# Patient Record
Sex: MALE | Race: WHITE | Employment: OTHER | URBAN - METROPOLITAN AREA
[De-identification: names, ages, dates, MRNs, and addresses within clinical notes are randomized per-mention and may not be internally consistent; named-entity substitution may affect disease eponyms.]

---

## 2018-01-01 ENCOUNTER — APPOINTMENT (OUTPATIENT)
Dept: DIALYSIS | Age: 68
DRG: 871 | End: 2018-01-01
Payer: MEDICARE

## 2018-01-01 ENCOUNTER — HOSPITAL ENCOUNTER (EMERGENCY)
Age: 68
Discharge: ANOTHER HOSPITAL | End: 2018-03-17
Payer: MEDICARE

## 2018-01-01 ENCOUNTER — APPOINTMENT (OUTPATIENT)
Dept: GENERAL RADIOLOGY | Age: 68
DRG: 871 | End: 2018-01-01
Payer: MEDICARE

## 2018-01-01 ENCOUNTER — APPOINTMENT (OUTPATIENT)
Dept: GENERAL RADIOLOGY | Age: 68
End: 2018-01-01
Payer: MEDICARE

## 2018-01-01 ENCOUNTER — APPOINTMENT (OUTPATIENT)
Dept: CT IMAGING | Age: 68
DRG: 871 | End: 2018-01-01
Payer: MEDICARE

## 2018-01-01 ENCOUNTER — APPOINTMENT (OUTPATIENT)
Dept: MRI IMAGING | Age: 68
DRG: 871 | End: 2018-01-01
Payer: MEDICARE

## 2018-01-01 ENCOUNTER — TELEPHONE (OUTPATIENT)
Dept: INTERNAL MEDICINE | Age: 68
End: 2018-01-01

## 2018-01-01 ENCOUNTER — HOSPITAL ENCOUNTER (INPATIENT)
Age: 68
LOS: 5 days | DRG: 871 | End: 2018-03-22
Attending: EMERGENCY MEDICINE | Admitting: INTERNAL MEDICINE
Payer: MEDICARE

## 2018-01-01 VITALS
RESPIRATION RATE: 24 BRPM | TEMPERATURE: 101.9 F | WEIGHT: 254.19 LBS | HEIGHT: 71 IN | HEART RATE: 103 BPM | OXYGEN SATURATION: 97 % | DIASTOLIC BLOOD PRESSURE: 47 MMHG | BODY MASS INDEX: 35.59 KG/M2 | SYSTOLIC BLOOD PRESSURE: 77 MMHG

## 2018-01-01 VITALS
SYSTOLIC BLOOD PRESSURE: 102 MMHG | TEMPERATURE: 98.1 F | OXYGEN SATURATION: 97 % | WEIGHT: 242.51 LBS | RESPIRATION RATE: 24 BRPM | HEART RATE: 73 BPM | DIASTOLIC BLOOD PRESSURE: 41 MMHG

## 2018-01-01 DIAGNOSIS — I46.9 CARDIAC ARREST (HCC): Primary | ICD-10-CM

## 2018-01-01 DIAGNOSIS — A41.9 SEPSIS, DUE TO UNSPECIFIED ORGANISM: ICD-10-CM

## 2018-01-01 DIAGNOSIS — N39.0 URINARY TRACT INFECTION WITHOUT HEMATURIA, SITE UNSPECIFIED: ICD-10-CM

## 2018-01-01 LAB
-: ABNORMAL
ABSOLUTE EOS #: 0 K/UL (ref 0–0.4)
ABSOLUTE EOS #: 0.11 K/UL (ref 0–0.4)
ABSOLUTE EOS #: 0.19 K/UL (ref 0–0.4)
ABSOLUTE EOS #: 0.21 K/UL (ref 0–0.4)
ABSOLUTE IMMATURE GRANULOCYTE: 0 K/UL (ref 0–0.3)
ABSOLUTE IMMATURE GRANULOCYTE: 0 K/UL (ref 0–0.3)
ABSOLUTE IMMATURE GRANULOCYTE: 0.21 K/UL (ref 0–0.3)
ABSOLUTE IMMATURE GRANULOCYTE: ABNORMAL K/UL (ref 0–0.3)
ABSOLUTE LYMPH #: 0.56 K/UL (ref 1–4.8)
ABSOLUTE LYMPH #: 0.68 K/UL (ref 1–4.8)
ABSOLUTE LYMPH #: 0.77 K/UL (ref 1–4.8)
ABSOLUTE LYMPH #: 1.05 K/UL (ref 1–4.8)
ABSOLUTE MONO #: 0.34 K/UL (ref 0.1–0.8)
ABSOLUTE MONO #: 0.37 K/UL (ref 0.2–0.8)
ABSOLUTE MONO #: 1.88 K/UL (ref 0.1–0.8)
ABSOLUTE MONO #: 2.06 K/UL (ref 0.1–0.8)
ALBUMIN SERPL-MCNC: 3.2 G/DL (ref 3.5–5.2)
ALBUMIN SERPL-MCNC: 3.4 G/DL (ref 3.5–5.2)
ALBUMIN/GLOBULIN RATIO: 1.4 (ref 1–2.5)
ALBUMIN/GLOBULIN RATIO: ABNORMAL (ref 1–2.5)
ALLEN TEST: ABNORMAL
ALLEN TEST: POSITIVE
ALLEN TEST: POSITIVE
ALP BLD-CCNC: 96 U/L (ref 40–129)
ALP BLD-CCNC: 96 U/L (ref 40–129)
ALT SERPL-CCNC: 70 U/L (ref 5–41)
ALT SERPL-CCNC: 74 U/L (ref 5–41)
AMMONIA: 29 UMOL/L (ref 16–60)
AMORPHOUS: ABNORMAL
ANION GAP SERPL CALCULATED.3IONS-SCNC: 14 MMOL/L (ref 9–17)
ANION GAP SERPL CALCULATED.3IONS-SCNC: 16 MMOL/L (ref 9–17)
ANION GAP SERPL CALCULATED.3IONS-SCNC: 17 MMOL/L (ref 9–17)
ANION GAP SERPL CALCULATED.3IONS-SCNC: 17 MMOL/L (ref 9–17)
ANION GAP SERPL CALCULATED.3IONS-SCNC: 18 MMOL/L (ref 9–17)
ANION GAP SERPL CALCULATED.3IONS-SCNC: 19 MMOL/L (ref 9–17)
ANION GAP SERPL CALCULATED.3IONS-SCNC: 19 MMOL/L (ref 9–17)
ANION GAP: 8 MMOL/L (ref 7–16)
AST SERPL-CCNC: 66 U/L
AST SERPL-CCNC: 72 U/L
BACTERIA: ABNORMAL
BASOPHILS # BLD: 0 %
BASOPHILS # BLD: 0 % (ref 0–2)
BASOPHILS # BLD: 0 % (ref 0–2)
BASOPHILS # BLD: 1 % (ref 0–2)
BASOPHILS ABSOLUTE: 0 K/UL (ref 0–0.2)
BASOPHILS ABSOLUTE: 0.21 K/UL (ref 0–0.2)
BILIRUB SERPL-MCNC: 0.38 MG/DL (ref 0.3–1.2)
BILIRUB SERPL-MCNC: 0.42 MG/DL (ref 0.3–1.2)
BILIRUBIN DIRECT: 0.15 MG/DL
BILIRUBIN DIRECT: 0.18 MG/DL
BILIRUBIN URINE: ABNORMAL
BILIRUBIN, INDIRECT: 0.23 MG/DL (ref 0–1)
BILIRUBIN, INDIRECT: 0.24 MG/DL (ref 0–1)
BNP INTERPRETATION: ABNORMAL
BUN BLDV-MCNC: 22 MG/DL (ref 8–23)
BUN BLDV-MCNC: 26 MG/DL (ref 8–23)
BUN BLDV-MCNC: 35 MG/DL (ref 8–23)
BUN BLDV-MCNC: 37 MG/DL (ref 8–23)
BUN BLDV-MCNC: 40 MG/DL (ref 8–23)
BUN BLDV-MCNC: 41 MG/DL (ref 8–23)
BUN BLDV-MCNC: 43 MG/DL (ref 8–23)
BUN BLDV-MCNC: 44 MG/DL (ref 8–23)
BUN BLDV-MCNC: 47 MG/DL (ref 8–23)
BUN BLDV-MCNC: 48 MG/DL (ref 8–23)
BUN BLDV-MCNC: 50 MG/DL (ref 8–23)
BUN/CREAT BLD: 5 (ref 9–20)
BUN/CREAT BLD: ABNORMAL (ref 9–20)
CALCIUM IONIZED: 0.97 MMOL/L (ref 1.13–1.33)
CALCIUM IONIZED: 1.04 MMOL/L (ref 1.13–1.33)
CALCIUM IONIZED: 1.06 MMOL/L (ref 1.13–1.33)
CALCIUM IONIZED: 1.06 MMOL/L (ref 1.13–1.33)
CALCIUM IONIZED: 1.08 MMOL/L (ref 1.13–1.33)
CALCIUM IONIZED: 1.11 MMOL/L (ref 1.13–1.33)
CALCIUM IONIZED: 1.11 MMOL/L (ref 1.13–1.33)
CALCIUM IONIZED: 1.14 MMOL/L (ref 1.13–1.33)
CALCIUM IONIZED: 1.17 MMOL/L (ref 1.13–1.33)
CALCIUM IONIZED: 1.22 MMOL/L (ref 1.13–1.33)
CALCIUM SERPL-MCNC: 7.9 MG/DL (ref 8.6–10.4)
CALCIUM SERPL-MCNC: 8.2 MG/DL (ref 8.6–10.4)
CALCIUM SERPL-MCNC: 8.4 MG/DL (ref 8.6–10.4)
CALCIUM SERPL-MCNC: 8.6 MG/DL (ref 8.6–10.4)
CASTS UA: ABNORMAL /LPF (ref 0–2)
CHLORIDE BLD-SCNC: 89 MMOL/L (ref 98–107)
CHLORIDE BLD-SCNC: 90 MMOL/L (ref 98–107)
CHLORIDE BLD-SCNC: 91 MMOL/L (ref 98–107)
CHLORIDE BLD-SCNC: 92 MMOL/L (ref 98–107)
CHLORIDE BLD-SCNC: 93 MMOL/L (ref 98–107)
CHLORIDE BLD-SCNC: 94 MMOL/L (ref 98–107)
CHLORIDE BLD-SCNC: 94 MMOL/L (ref 98–107)
CHLORIDE BLD-SCNC: 96 MMOL/L (ref 98–107)
CHLORIDE BLD-SCNC: 96 MMOL/L (ref 98–107)
CHLORIDE BLD-SCNC: 98 MMOL/L (ref 98–107)
CHLORIDE BLD-SCNC: 99 MMOL/L (ref 98–107)
CK MB: 9 NG/ML
CO2: 19 MMOL/L (ref 20–31)
CO2: 20 MMOL/L (ref 20–31)
CO2: 20 MMOL/L (ref 20–31)
CO2: 21 MMOL/L (ref 20–31)
CO2: 22 MMOL/L (ref 20–31)
CO2: 23 MMOL/L (ref 20–31)
CO2: 24 MMOL/L (ref 20–31)
CO2: 24 MMOL/L (ref 20–31)
COLOR: ABNORMAL
COMMENT UA: ABNORMAL
CORTISOL COLLECTION INFO: ABNORMAL
CORTISOL: 31.9 UG/DL (ref 2.7–18.4)
CREAT SERPL-MCNC: 4.84 MG/DL (ref 0.7–1.2)
CREAT SERPL-MCNC: 5.27 MG/DL (ref 0.7–1.2)
CREAT SERPL-MCNC: 6.91 MG/DL (ref 0.7–1.2)
CREAT SERPL-MCNC: 7.16 MG/DL (ref 0.7–1.2)
CREAT SERPL-MCNC: 7.39 MG/DL (ref 0.7–1.2)
CREAT SERPL-MCNC: 7.74 MG/DL (ref 0.7–1.2)
CREAT SERPL-MCNC: 7.97 MG/DL (ref 0.7–1.2)
CREAT SERPL-MCNC: 8.05 MG/DL (ref 0.7–1.2)
CREAT SERPL-MCNC: 8.18 MG/DL (ref 0.7–1.2)
CREAT SERPL-MCNC: 8.35 MG/DL (ref 0.7–1.2)
CREAT SERPL-MCNC: 8.58 MG/DL (ref 0.7–1.2)
CRYSTALS, UA: ABNORMAL /HPF
CULTURE: ABNORMAL
DIFFERENTIAL TYPE: ABNORMAL
DIRECT EXAM: ABNORMAL
DIRECT EXAM: NORMAL
DIRECT EXAM: NORMAL
EKG ATRIAL RATE: 46 BPM
EKG ATRIAL RATE: 70 BPM
EKG ATRIAL RATE: 87 BPM
EKG P AXIS: 68 DEGREES
EKG P AXIS: 72 DEGREES
EKG P AXIS: 87 DEGREES
EKG P-R INTERVAL: 220 MS
EKG P-R INTERVAL: 228 MS
EKG P-R INTERVAL: 242 MS
EKG Q-T INTERVAL: 418 MS
EKG Q-T INTERVAL: 430 MS
EKG Q-T INTERVAL: 574 MS
EKG QRS DURATION: 150 MS
EKG QRS DURATION: 158 MS
EKG QRS DURATION: 200 MS
EKG QTC CALCULATION (BAZETT): 464 MS
EKG QTC CALCULATION (BAZETT): 502 MS
EKG QTC CALCULATION (BAZETT): 502 MS
EKG R AXIS: -65 DEGREES
EKG R AXIS: -79 DEGREES
EKG R AXIS: -86 DEGREES
EKG T AXIS: -55 DEGREES
EKG T AXIS: 54 DEGREES
EKG T AXIS: 85 DEGREES
EKG VENTRICULAR RATE: 46 BPM
EKG VENTRICULAR RATE: 70 BPM
EKG VENTRICULAR RATE: 87 BPM
EOSINOPHILS RELATIVE PERCENT: 0 % (ref 1–4)
EOSINOPHILS RELATIVE PERCENT: 1 % (ref 1–4)
EPITHELIAL CELLS UA: ABNORMAL /HPF (ref 0–5)
FIO2: 100
FIO2: 30
FIO2: 40
FIO2: 50
GFR AFRICAN AMERICAN: 10 ML/MIN
GFR AFRICAN AMERICAN: 13 ML/MIN
GFR AFRICAN AMERICAN: 15 ML/MIN
GFR AFRICAN AMERICAN: 8 ML/MIN
GFR AFRICAN AMERICAN: 9 ML/MIN
GFR AFRICAN AMERICAN: 9 ML/MIN
GFR NON-AFRICAN AMERICAN: 11 ML/MIN
GFR NON-AFRICAN AMERICAN: 12 ML/MIN
GFR NON-AFRICAN AMERICAN: 6 ML/MIN
GFR NON-AFRICAN AMERICAN: 6 ML/MIN
GFR NON-AFRICAN AMERICAN: 7 ML/MIN
GFR NON-AFRICAN AMERICAN: 8 ML/MIN
GFR NON-AFRICAN AMERICAN: 8 ML/MIN
GFR SERPL CREATININE-BSD FRML MDRD: 9 ML/MIN
GFR SERPL CREATININE-BSD FRML MDRD: ABNORMAL ML/MIN/{1.73_M2}
GLOBULIN: ABNORMAL G/DL (ref 1.5–3.8)
GLOBULIN: ABNORMAL G/DL (ref 1.5–3.8)
GLUCOSE BLD-MCNC: 117 MG/DL (ref 70–99)
GLUCOSE BLD-MCNC: 124 MG/DL (ref 74–100)
GLUCOSE BLD-MCNC: 126 MG/DL (ref 75–110)
GLUCOSE BLD-MCNC: 131 MG/DL (ref 70–99)
GLUCOSE BLD-MCNC: 131 MG/DL (ref 75–110)
GLUCOSE BLD-MCNC: 134 MG/DL (ref 75–110)
GLUCOSE BLD-MCNC: 145 MG/DL (ref 75–110)
GLUCOSE BLD-MCNC: 146 MG/DL (ref 75–110)
GLUCOSE BLD-MCNC: 150 MG/DL (ref 75–110)
GLUCOSE BLD-MCNC: 150 MG/DL (ref 75–110)
GLUCOSE BLD-MCNC: 151 MG/DL (ref 75–110)
GLUCOSE BLD-MCNC: 152 MG/DL (ref 70–99)
GLUCOSE BLD-MCNC: 154 MG/DL (ref 70–99)
GLUCOSE BLD-MCNC: 154 MG/DL (ref 75–110)
GLUCOSE BLD-MCNC: 155 MG/DL (ref 75–110)
GLUCOSE BLD-MCNC: 160 MG/DL (ref 75–110)
GLUCOSE BLD-MCNC: 166 MG/DL (ref 70–99)
GLUCOSE BLD-MCNC: 167 MG/DL (ref 75–110)
GLUCOSE BLD-MCNC: 167 MG/DL (ref 75–110)
GLUCOSE BLD-MCNC: 186 MG/DL (ref 75–110)
GLUCOSE BLD-MCNC: 187 MG/DL (ref 75–110)
GLUCOSE BLD-MCNC: 192 MG/DL (ref 70–99)
GLUCOSE BLD-MCNC: 197 MG/DL (ref 74–100)
GLUCOSE BLD-MCNC: 207 MG/DL (ref 70–99)
GLUCOSE BLD-MCNC: 209 MG/DL (ref 75–110)
GLUCOSE BLD-MCNC: 210 MG/DL (ref 75–110)
GLUCOSE BLD-MCNC: 218 MG/DL (ref 75–110)
GLUCOSE BLD-MCNC: 220 MG/DL (ref 70–99)
GLUCOSE BLD-MCNC: 36 MG/DL (ref 74–100)
GLUCOSE BLD-MCNC: 36 MG/DL (ref 75–110)
GLUCOSE BLD-MCNC: 4 MG/DL (ref 70–99)
GLUCOSE BLD-MCNC: 46 MG/DL (ref 70–99)
GLUCOSE BLD-MCNC: 64 MG/DL (ref 75–110)
GLUCOSE BLD-MCNC: 73 MG/DL (ref 75–110)
GLUCOSE BLD-MCNC: 74 MG/DL (ref 75–110)
GLUCOSE BLD-MCNC: 76 MG/DL (ref 70–99)
GLUCOSE BLD-MCNC: 77 MG/DL (ref 75–110)
GLUCOSE BLD-MCNC: 80 MG/DL (ref 75–110)
GLUCOSE BLD-MCNC: 82 MG/DL (ref 75–110)
GLUCOSE BLD-MCNC: 88 MG/DL (ref 75–110)
GLUCOSE BLD-MCNC: <10 MG/DL (ref 75–110)
GLUCOSE URINE: ABNORMAL
HCT VFR BLD CALC: 25.2 % (ref 40.7–50.3)
HCT VFR BLD CALC: 25.7 % (ref 40.7–50.3)
HCT VFR BLD CALC: 26.9 % (ref 40.7–50.3)
HCT VFR BLD CALC: 29.5 % (ref 40.7–50.3)
HCT VFR BLD CALC: 31.4 % (ref 40.7–50.3)
HCT VFR BLD CALC: 32.6 % (ref 40.7–50.3)
HCT VFR BLD CALC: 34.1 % (ref 41–53)
HEMOGLOBIN: 10.1 G/DL (ref 13–17)
HEMOGLOBIN: 11 G/DL (ref 13.5–17.5)
HEMOGLOBIN: 8 G/DL (ref 13–17)
HEMOGLOBIN: 8 G/DL (ref 13–17)
HEMOGLOBIN: 8.5 G/DL (ref 13–17)
HEMOGLOBIN: 9.4 G/DL (ref 13–17)
HEMOGLOBIN: 9.9 G/DL (ref 13–17)
IMMATURE GRANULOCYTES: 0 %
IMMATURE GRANULOCYTES: 0 %
IMMATURE GRANULOCYTES: 1 %
IMMATURE GRANULOCYTES: ABNORMAL %
INR BLD: 0.9
INR BLD: 1
KETONES, URINE: ABNORMAL
LACTIC ACID, WHOLE BLOOD: 0.7 MMOL/L (ref 0.7–2.1)
LACTIC ACID, WHOLE BLOOD: 0.8 MMOL/L (ref 0.7–2.1)
LACTIC ACID, WHOLE BLOOD: 0.9 MMOL/L (ref 0.7–2.1)
LACTIC ACID, WHOLE BLOOD: 0.9 MMOL/L (ref 0.7–2.1)
LACTIC ACID, WHOLE BLOOD: 1 MMOL/L (ref 0.7–2.1)
LACTIC ACID, WHOLE BLOOD: 1 MMOL/L (ref 0.7–2.1)
LACTIC ACID, WHOLE BLOOD: 2.7 MMOL/L (ref 0.7–2.1)
LACTIC ACID: 5.4 MMOL/L (ref 0.5–2.2)
LEUKOCYTE ESTERASE, URINE: ABNORMAL
LIPASE: 24 U/L (ref 13–60)
LV EF: 65 %
LVEF MODALITY: NORMAL
LYMPHOCYTES # BLD: 3 % (ref 24–44)
LYMPHOCYTES # BLD: 3 % (ref 24–44)
LYMPHOCYTES # BLD: 5 % (ref 24–44)
LYMPHOCYTES # BLD: 6 % (ref 24–44)
Lab: ABNORMAL
Lab: ABNORMAL
Lab: NORMAL
MAGNESIUM: 1.5 MG/DL (ref 1.6–2.6)
MAGNESIUM: 1.9 MG/DL (ref 1.6–2.6)
MAGNESIUM: 1.9 MG/DL (ref 1.6–2.6)
MAGNESIUM: 2 MG/DL (ref 1.6–2.6)
MAGNESIUM: 2.1 MG/DL (ref 1.6–2.6)
MCH RBC QN AUTO: 31.4 PG (ref 25.2–33.5)
MCH RBC QN AUTO: 31.8 PG (ref 25.2–33.5)
MCH RBC QN AUTO: 32 PG (ref 25.2–33.5)
MCH RBC QN AUTO: 32.2 PG (ref 25.2–33.5)
MCH RBC QN AUTO: 32.3 PG (ref 25.2–33.5)
MCH RBC QN AUTO: 32.5 PG (ref 26–34)
MCHC RBC AUTO-ENTMCNC: 30.4 G/DL (ref 28.4–34.8)
MCHC RBC AUTO-ENTMCNC: 31.1 G/DL (ref 28.4–34.8)
MCHC RBC AUTO-ENTMCNC: 31.6 G/DL (ref 28.4–34.8)
MCHC RBC AUTO-ENTMCNC: 31.9 G/DL (ref 28.4–34.8)
MCHC RBC AUTO-ENTMCNC: 32.2 G/DL (ref 28.4–34.8)
MCHC RBC AUTO-ENTMCNC: 32.2 G/DL (ref 31–37)
MCV RBC AUTO: 100.3 FL (ref 82.6–102.9)
MCV RBC AUTO: 100.7 FL (ref 82.6–102.9)
MCV RBC AUTO: 100.8 FL (ref 80–100)
MCV RBC AUTO: 100.8 FL (ref 82.6–102.9)
MCV RBC AUTO: 101 FL (ref 82.6–102.9)
MCV RBC AUTO: 105.5 FL (ref 82.6–102.9)
MODE: ABNORMAL
MODE: NORMAL
MONOCYTES # BLD: 2 % (ref 1–7)
MONOCYTES # BLD: 3 % (ref 1–7)
MONOCYTES # BLD: 8 % (ref 1–7)
MONOCYTES # BLD: 9 % (ref 1–7)
MORPHOLOGY: ABNORMAL
MORPHOLOGY: NORMAL
MRSA, DNA, NASAL: NORMAL
MUCUS: ABNORMAL
MYOGLOBIN: 1051 NG/ML (ref 28–72)
NEGATIVE BASE EXCESS, ART: 1 (ref 0–2)
NEGATIVE BASE EXCESS, ART: 1 (ref 0–2)
NEGATIVE BASE EXCESS, ART: 2 (ref 0–2)
NEGATIVE BASE EXCESS, ART: 3 (ref 0–2)
NEGATIVE BASE EXCESS, ART: 5 (ref 0–2)
NEGATIVE BASE EXCESS, ART: ABNORMAL (ref 0–2)
NEGATIVE BASE EXCESS, ART: NORMAL (ref 0–2)
NITRITE, URINE: POSITIVE
NRBC AUTOMATED: 0 PER 100 WBC
NRBC AUTOMATED: ABNORMAL PER 100 WBC
O2 DEVICE/FLOW/%: ABNORMAL
O2 DEVICE/FLOW/%: NORMAL
OTHER OBSERVATIONS UA: ABNORMAL
PARTIAL THROMBOPLASTIN TIME: 23.2 SEC (ref 20.5–30.5)
PARTIAL THROMBOPLASTIN TIME: 24.3 SEC (ref 23–31)
PARTIAL THROMBOPLASTIN TIME: 26.2 SEC (ref 20.5–30.5)
PARTIAL THROMBOPLASTIN TIME: 26.9 SEC (ref 20.5–30.5)
PARTIAL THROMBOPLASTIN TIME: 27.4 SEC (ref 20.5–30.5)
PATIENT TEMP: 34.1
PATIENT TEMP: 35.7
PATIENT TEMP: 37
PATIENT TEMP: 37.3
PATIENT TEMP: ABNORMAL
PATIENT TEMP: ABNORMAL
PATIENT TEMP: NORMAL
PDW BLD-RTO: 13.7 % (ref 11.8–14.4)
PDW BLD-RTO: 13.7 % (ref 11.8–14.4)
PDW BLD-RTO: 13.8 % (ref 11.8–14.4)
PDW BLD-RTO: 15.5 % (ref 11.5–14.5)
PH UA: 6.5 (ref 5–8)
PHOSPHORUS: 3.5 MG/DL (ref 2.5–4.5)
PHOSPHORUS: 3.5 MG/DL (ref 2.5–4.5)
PHOSPHORUS: 4.2 MG/DL (ref 2.5–4.5)
PHOSPHORUS: 5.1 MG/DL (ref 2.5–4.5)
PHOSPHORUS: 5.2 MG/DL (ref 2.5–4.5)
PHOSPHORUS: 5.5 MG/DL (ref 2.5–4.5)
PLATELET # BLD: 155 K/UL (ref 138–453)
PLATELET # BLD: 166 K/UL (ref 138–453)
PLATELET # BLD: 169 K/UL (ref 138–453)
PLATELET # BLD: 231 K/UL (ref 138–453)
PLATELET # BLD: 243 K/UL (ref 138–453)
PLATELET # BLD: 260 K/UL (ref 130–400)
PLATELET ESTIMATE: ABNORMAL
PMV BLD AUTO: 10.1 FL (ref 8.1–13.5)
PMV BLD AUTO: 10.3 FL (ref 8.1–13.5)
PMV BLD AUTO: 10.6 FL (ref 8.1–13.5)
PMV BLD AUTO: 10.7 FL (ref 8.1–13.5)
PMV BLD AUTO: 8.8 FL (ref 6–12)
PMV BLD AUTO: 9.8 FL (ref 8.1–13.5)
POC CHLORIDE: 103 MMOL/L (ref 98–107)
POC CREATININE: 7.72 MG/DL (ref 0.51–1.19)
POC HCO3: 22.4 MMOL/L (ref 21–28)
POC HCO3: 22.8 MMOL/L (ref 21–28)
POC HCO3: 23.9 MMOL/L (ref 21–28)
POC HCO3: 24 MMOL/L (ref 22–27)
POC HCO3: 25.5 MMOL/L (ref 21–28)
POC HCO3: 26.4 MMOL/L (ref 21–28)
POC HCO3: 26.8 MMOL/L (ref 21–28)
POC HEMATOCRIT: 31 % (ref 41–53)
POC HEMOGLOBIN: 10.5 G/DL (ref 13.5–17.5)
POC IONIZED CALCIUM: 1.08 MMOL/L (ref 1.15–1.33)
POC LACTIC ACID: 1.28 MMOL/L (ref 0.56–1.39)
POC O2 SATURATION: 100 %
POC O2 SATURATION: 95 % (ref 94–98)
POC O2 SATURATION: 96 % (ref 94–98)
POC O2 SATURATION: 98 % (ref 94–98)
POC O2 SATURATION: 98 % (ref 94–98)
POC O2 SATURATION: 99 % (ref 94–98)
POC O2 SATURATION: 99 % (ref 94–98)
POC PCO2 TEMP: 28 MM HG
POC PCO2 TEMP: 40 MM HG
POC PCO2 TEMP: ABNORMAL MM HG
POC PCO2 TEMP: NORMAL MM HG
POC PCO2: 31.4 MM HG (ref 35–48)
POC PCO2: 39.1 MM HG (ref 35–48)
POC PCO2: 44.9 MM HG (ref 35–48)
POC PCO2: 45.2 MM HG (ref 35–48)
POC PCO2: 51.8 MM HG (ref 35–48)
POC PCO2: 54 MM HG (ref 35–48)
POC PCO2: 66 MM HG (ref 32–45)
POC PH TEMP: 7.39
POC PH TEMP: 7.51
POC PH TEMP: ABNORMAL
POC PH TEMP: NORMAL
POC PH: 7.17 (ref 7.35–7.45)
POC PH: 7.28 (ref 7.35–7.45)
POC PH: 7.31 (ref 7.35–7.45)
POC PH: 7.32 (ref 7.35–7.45)
POC PH: 7.38 (ref 7.35–7.45)
POC PH: 7.39 (ref 7.35–7.45)
POC PH: 7.46 (ref 7.35–7.45)
POC PO2 TEMP: 127 MM HG
POC PO2 TEMP: 78 MM HG
POC PO2 TEMP: ABNORMAL MM HG
POC PO2 TEMP: NORMAL MM HG
POC PO2: 103.1 MM HG (ref 83–108)
POC PO2: 129.2 MM HG (ref 83–108)
POC PO2: 130.9 MM HG (ref 83–108)
POC PO2: 143.6 MM HG (ref 83–108)
POC PO2: 579 MM HG (ref 75–95)
POC PO2: 76.7 MM HG (ref 83–108)
POC PO2: 93 MM HG (ref 83–108)
POC POTASSIUM: 4.8 MMOL/L (ref 3.5–4.5)
POC SODIUM: 136 MMOL/L (ref 138–146)
POC TROPONIN I: 0.25 NG/ML (ref 0–0.1)
POC TROPONIN I: 0.34 NG/ML (ref 0–0.1)
POC TROPONIN INTERP: ABNORMAL
POC TROPONIN INTERP: ABNORMAL
POSITIVE BASE EXCESS, ART: 0 (ref 0–3)
POSITIVE BASE EXCESS, ART: 1 (ref 0–3)
POSITIVE BASE EXCESS, ART: ABNORMAL (ref 0–2)
POSITIVE BASE EXCESS, ART: ABNORMAL (ref 0–3)
POTASSIUM SERPL-SCNC: 3.6 MMOL/L (ref 3.7–5.3)
POTASSIUM SERPL-SCNC: 3.6 MMOL/L (ref 3.7–5.3)
POTASSIUM SERPL-SCNC: 3.7 MMOL/L (ref 3.7–5.3)
POTASSIUM SERPL-SCNC: 3.7 MMOL/L (ref 3.7–5.3)
POTASSIUM SERPL-SCNC: 3.8 MMOL/L (ref 3.7–5.3)
POTASSIUM SERPL-SCNC: 3.8 MMOL/L (ref 3.7–5.3)
POTASSIUM SERPL-SCNC: 3.9 MMOL/L (ref 3.7–5.3)
POTASSIUM SERPL-SCNC: 4 MMOL/L (ref 3.7–5.3)
POTASSIUM SERPL-SCNC: 4.1 MMOL/L (ref 3.7–5.3)
POTASSIUM SERPL-SCNC: 4.1 MMOL/L (ref 3.7–5.3)
POTASSIUM SERPL-SCNC: 4.3 MMOL/L (ref 3.7–5.3)
POTASSIUM SERPL-SCNC: 4.3 MMOL/L (ref 3.7–5.3)
POTASSIUM SERPL-SCNC: 4.8 MMOL/L (ref 3.7–5.3)
POTASSIUM SERPL-SCNC: 5 MMOL/L (ref 3.7–5.3)
POTASSIUM SERPL-SCNC: 5.1 MMOL/L (ref 3.7–5.3)
POTASSIUM SERPL-SCNC: 5.3 MMOL/L (ref 3.7–5.3)
PRO-BNP: ABNORMAL PG/ML
PROTEIN UA: ABNORMAL
PROTHROMBIN TIME: 10.1 SEC (ref 9–12)
PROTHROMBIN TIME: 11 SEC (ref 9–12)
PROTHROMBIN TIME: 11.1 SEC (ref 9–12)
PROTHROMBIN TIME: 11.1 SEC (ref 9–12)
RBC # BLD: 2.55 M/UL (ref 4.21–5.77)
RBC # BLD: 2.67 M/UL (ref 4.21–5.77)
RBC # BLD: 2.92 M/UL (ref 4.21–5.77)
RBC # BLD: 3.09 M/UL (ref 4.21–5.77)
RBC # BLD: 3.13 M/UL (ref 4.21–5.77)
RBC # BLD: 3.38 M/UL (ref 4.5–5.9)
RBC # BLD: ABNORMAL 10*6/UL
RBC UA: ABNORMAL /HPF (ref 0–2)
RENAL EPITHELIAL, UA: ABNORMAL /HPF
SAMPLE SITE: ABNORMAL
SAMPLE SITE: NORMAL
SEG NEUTROPHILS: 83 % (ref 36–66)
SEG NEUTROPHILS: 89 % (ref 36–66)
SEG NEUTROPHILS: 90 % (ref 36–66)
SEG NEUTROPHILS: 94 % (ref 36–66)
SEGMENTED NEUTROPHILS ABSOLUTE COUNT: 10.17 K/UL (ref 1.8–7.7)
SEGMENTED NEUTROPHILS ABSOLUTE COUNT: 17.34 K/UL (ref 1.8–7.7)
SEGMENTED NEUTROPHILS ABSOLUTE COUNT: 17.58 K/UL (ref 1.8–7.7)
SEGMENTED NEUTROPHILS ABSOLUTE COUNT: 22.97 K/UL (ref 1.8–7.7)
SODIUM BLD-SCNC: 127 MMOL/L (ref 135–144)
SODIUM BLD-SCNC: 130 MMOL/L (ref 135–144)
SODIUM BLD-SCNC: 131 MMOL/L (ref 135–144)
SODIUM BLD-SCNC: 132 MMOL/L (ref 135–144)
SODIUM BLD-SCNC: 132 MMOL/L (ref 135–144)
SODIUM BLD-SCNC: 133 MMOL/L (ref 135–144)
SODIUM BLD-SCNC: 133 MMOL/L (ref 135–144)
SODIUM BLD-SCNC: 137 MMOL/L (ref 135–144)
SPECIFIC GRAVITY UA: 1.02 (ref 1–1.03)
SPECIMEN DESCRIPTION: ABNORMAL
SPECIMEN DESCRIPTION: ABNORMAL
SPECIMEN DESCRIPTION: NORMAL
SPECIMEN DESCRIPTION: NORMAL
STATUS: ABNORMAL
STATUS: ABNORMAL
STATUS: NORMAL
TCO2 (CALC), ART: 23 MMOL/L (ref 22–29)
TCO2 (CALC), ART: 24 MMOL/L (ref 22–29)
TCO2 (CALC), ART: 25 MMOL/L (ref 22–29)
TCO2 (CALC), ART: 26 MMOL/L (ref 23–28)
TCO2 (CALC), ART: 27 MMOL/L (ref 22–29)
TCO2 (CALC), ART: 28 MMOL/L (ref 22–29)
TCO2 (CALC), ART: 28 MMOL/L (ref 22–29)
TOTAL CK: 221 U/L (ref 39–308)
TOTAL PROTEIN: 5.8 G/DL (ref 6.4–8.3)
TOTAL PROTEIN: 5.8 G/DL (ref 6.4–8.3)
TRICHOMONAS: ABNORMAL
TRIGL SERPL-MCNC: 225 MG/DL
TROPONIN INTERP: ABNORMAL
TROPONIN T: 0.23 NG/ML
TROPONIN T: 0.25 NG/ML
TROPONIN T: 0.26 NG/ML
TROPONIN T: 0.27 NG/ML
TROPONIN T: 0.29 NG/ML
TROPONIN T: 0.38 NG/ML
TURBIDITY: ABNORMAL
URINE HGB: ABNORMAL
UROBILINOGEN, URINE: NORMAL
WBC # BLD: 11.3 K/UL (ref 3.5–11.3)
WBC # BLD: 14.1 K/UL (ref 3.5–11.3)
WBC # BLD: 16.4 K/UL (ref 3.5–11.3)
WBC # BLD: 18.7 K/UL (ref 3.5–11)
WBC # BLD: 20.9 K/UL (ref 3.5–11.3)
WBC # BLD: 25.8 K/UL (ref 3.5–11.3)
WBC # BLD: ABNORMAL 10*3/UL
WBC UA: ABNORMAL /HPF (ref 0–5)
YEAST: ABNORMAL

## 2018-01-01 PROCEDURE — 6360000002 HC RX W HCPCS: Performed by: INTERNAL MEDICINE

## 2018-01-01 PROCEDURE — 36415 COLL VENOUS BLD VENIPUNCTURE: CPT

## 2018-01-01 PROCEDURE — 84100 ASSAY OF PHOSPHORUS: CPT

## 2018-01-01 PROCEDURE — 6370000000 HC RX 637 (ALT 250 FOR IP): Performed by: EMERGENCY MEDICINE

## 2018-01-01 PROCEDURE — 82533 TOTAL CORTISOL: CPT

## 2018-01-01 PROCEDURE — 6360000002 HC RX W HCPCS: Performed by: STUDENT IN AN ORGANIZED HEALTH CARE EDUCATION/TRAINING PROGRAM

## 2018-01-01 PROCEDURE — 94003 VENT MGMT INPAT SUBQ DAY: CPT

## 2018-01-01 PROCEDURE — 6360000002 HC RX W HCPCS: Performed by: HOSPITALIST

## 2018-01-01 PROCEDURE — 70551 MRI BRAIN STEM W/O DYE: CPT

## 2018-01-01 PROCEDURE — 93306 TTE W/DOPPLER COMPLETE: CPT

## 2018-01-01 PROCEDURE — 93005 ELECTROCARDIOGRAM TRACING: CPT

## 2018-01-01 PROCEDURE — 70450 CT HEAD/BRAIN W/O DYE: CPT

## 2018-01-01 PROCEDURE — 2580000003 HC RX 258

## 2018-01-01 PROCEDURE — 83605 ASSAY OF LACTIC ACID: CPT

## 2018-01-01 PROCEDURE — 84132 ASSAY OF SERUM POTASSIUM: CPT

## 2018-01-01 PROCEDURE — 2500000003 HC RX 250 WO HCPCS: Performed by: HOSPITALIST

## 2018-01-01 PROCEDURE — 99233 SBSQ HOSP IP/OBS HIGH 50: CPT | Performed by: INTERNAL MEDICINE

## 2018-01-01 PROCEDURE — 36620 INSERTION CATHETER ARTERY: CPT

## 2018-01-01 PROCEDURE — 6370000000 HC RX 637 (ALT 250 FOR IP): Performed by: INTERNAL MEDICINE

## 2018-01-01 PROCEDURE — 71045 X-RAY EXAM CHEST 1 VIEW: CPT

## 2018-01-01 PROCEDURE — 82140 ASSAY OF AMMONIA: CPT

## 2018-01-01 PROCEDURE — 87070 CULTURE OTHR SPECIMN AEROBIC: CPT

## 2018-01-01 PROCEDURE — 2500000003 HC RX 250 WO HCPCS: Performed by: STUDENT IN AN ORGANIZED HEALTH CARE EDUCATION/TRAINING PROGRAM

## 2018-01-01 PROCEDURE — 6360000002 HC RX W HCPCS: Performed by: EMERGENCY MEDICINE

## 2018-01-01 PROCEDURE — 82947 ASSAY GLUCOSE BLOOD QUANT: CPT

## 2018-01-01 PROCEDURE — 82803 BLOOD GASES ANY COMBINATION: CPT

## 2018-01-01 PROCEDURE — 81001 URINALYSIS AUTO W/SCOPE: CPT

## 2018-01-01 PROCEDURE — 82330 ASSAY OF CALCIUM: CPT

## 2018-01-01 PROCEDURE — 85730 THROMBOPLASTIN TIME PARTIAL: CPT

## 2018-01-01 PROCEDURE — 83874 ASSAY OF MYOGLOBIN: CPT

## 2018-01-01 PROCEDURE — 80048 BASIC METABOLIC PNL TOTAL CA: CPT

## 2018-01-01 PROCEDURE — 83735 ASSAY OF MAGNESIUM: CPT

## 2018-01-01 PROCEDURE — 96374 THER/PROPH/DIAG INJ IV PUSH: CPT

## 2018-01-01 PROCEDURE — 2500000003 HC RX 250 WO HCPCS

## 2018-01-01 PROCEDURE — 94002 VENT MGMT INPAT INIT DAY: CPT

## 2018-01-01 PROCEDURE — 87040 BLOOD CULTURE FOR BACTERIA: CPT

## 2018-01-01 PROCEDURE — 84478 ASSAY OF TRIGLYCERIDES: CPT

## 2018-01-01 PROCEDURE — 2000000000 HC ICU R&B

## 2018-01-01 PROCEDURE — 2580000003 HC RX 258: Performed by: INTERNAL MEDICINE

## 2018-01-01 PROCEDURE — 2580000003 HC RX 258: Performed by: STUDENT IN AN ORGANIZED HEALTH CARE EDUCATION/TRAINING PROGRAM

## 2018-01-01 PROCEDURE — 99291 CRITICAL CARE FIRST HOUR: CPT | Performed by: INTERNAL MEDICINE

## 2018-01-01 PROCEDURE — 82435 ASSAY OF BLOOD CHLORIDE: CPT

## 2018-01-01 PROCEDURE — 2580000003 HC RX 258: Performed by: EMERGENCY MEDICINE

## 2018-01-01 PROCEDURE — G8978 MOBILITY CURRENT STATUS: HCPCS

## 2018-01-01 PROCEDURE — 2580000003 HC RX 258: Performed by: HOSPITALIST

## 2018-01-01 PROCEDURE — 6370000000 HC RX 637 (ALT 250 FOR IP): Performed by: STUDENT IN AN ORGANIZED HEALTH CARE EDUCATION/TRAINING PROGRAM

## 2018-01-01 PROCEDURE — 36556 INSERT NON-TUNNEL CV CATH: CPT

## 2018-01-01 PROCEDURE — 84295 ASSAY OF SERUM SODIUM: CPT

## 2018-01-01 PROCEDURE — 84484 ASSAY OF TROPONIN QUANT: CPT

## 2018-01-01 PROCEDURE — 2500000003 HC RX 250 WO HCPCS: Performed by: EMERGENCY MEDICINE

## 2018-01-01 PROCEDURE — 83690 ASSAY OF LIPASE: CPT

## 2018-01-01 PROCEDURE — 94762 N-INVAS EAR/PLS OXIMTRY CONT: CPT

## 2018-01-01 PROCEDURE — 97161 PT EVAL LOW COMPLEX 20 MIN: CPT

## 2018-01-01 PROCEDURE — 1200000000 HC SEMI PRIVATE

## 2018-01-01 PROCEDURE — S0028 INJECTION, FAMOTIDINE, 20 MG: HCPCS | Performed by: EMERGENCY MEDICINE

## 2018-01-01 PROCEDURE — 85610 PROTHROMBIN TIME: CPT

## 2018-01-01 PROCEDURE — 94770 HC ETCO2 MONITOR DAILY: CPT

## 2018-01-01 PROCEDURE — 6370000000 HC RX 637 (ALT 250 FOR IP): Performed by: HOSPITALIST

## 2018-01-01 PROCEDURE — 85014 HEMATOCRIT: CPT

## 2018-01-01 PROCEDURE — 74176 CT ABD & PELVIS W/O CONTRAST: CPT

## 2018-01-01 PROCEDURE — 6360000002 HC RX W HCPCS

## 2018-01-01 PROCEDURE — 85025 COMPLETE CBC W/AUTO DIFF WBC: CPT

## 2018-01-01 PROCEDURE — 94761 N-INVAS EAR/PLS OXIMETRY MLT: CPT

## 2018-01-01 PROCEDURE — 71250 CT THORAX DX C-: CPT

## 2018-01-01 PROCEDURE — 82565 ASSAY OF CREATININE: CPT

## 2018-01-01 PROCEDURE — 99223 1ST HOSP IP/OBS HIGH 75: CPT | Performed by: PSYCHIATRY & NEUROLOGY

## 2018-01-01 PROCEDURE — C9113 INJ PANTOPRAZOLE SODIUM, VIA: HCPCS | Performed by: STUDENT IN AN ORGANIZED HEALTH CARE EDUCATION/TRAINING PROGRAM

## 2018-01-01 PROCEDURE — 87205 SMEAR GRAM STAIN: CPT

## 2018-01-01 PROCEDURE — G8979 MOBILITY GOAL STATUS: HCPCS

## 2018-01-01 PROCEDURE — 89220 SPUTUM SPECIMEN COLLECTION: CPT

## 2018-01-01 PROCEDURE — 94640 AIRWAY INHALATION TREATMENT: CPT

## 2018-01-01 PROCEDURE — 85027 COMPLETE CBC AUTOMATED: CPT

## 2018-01-01 PROCEDURE — 6370000000 HC RX 637 (ALT 250 FOR IP)

## 2018-01-01 PROCEDURE — 5A1D70Z PERFORMANCE OF URINARY FILTRATION, INTERMITTENT, LESS THAN 6 HOURS PER DAY: ICD-10-PCS | Performed by: INTERNAL MEDICINE

## 2018-01-01 PROCEDURE — 36600 WITHDRAWAL OF ARTERIAL BLOOD: CPT

## 2018-01-01 PROCEDURE — 87086 URINE CULTURE/COLONY COUNT: CPT

## 2018-01-01 PROCEDURE — 96375 TX/PRO/DX INJ NEW DRUG ADDON: CPT

## 2018-01-01 PROCEDURE — 99223 1ST HOSP IP/OBS HIGH 75: CPT | Performed by: INTERNAL MEDICINE

## 2018-01-01 PROCEDURE — 80076 HEPATIC FUNCTION PANEL: CPT

## 2018-01-01 PROCEDURE — 99232 SBSQ HOSP IP/OBS MODERATE 35: CPT | Performed by: INTERNAL MEDICINE

## 2018-01-01 PROCEDURE — 82553 CREATINE MB FRACTION: CPT

## 2018-01-01 PROCEDURE — 99285 EMERGENCY DEPT VISIT HI MDM: CPT

## 2018-01-01 PROCEDURE — 99213 OFFICE O/P EST LOW 20 MIN: CPT

## 2018-01-01 PROCEDURE — 5A1945Z RESPIRATORY VENTILATION, 24-96 CONSECUTIVE HOURS: ICD-10-PCS | Performed by: INTERNAL MEDICINE

## 2018-01-01 PROCEDURE — 97110 THERAPEUTIC EXERCISES: CPT

## 2018-01-01 PROCEDURE — 82550 ASSAY OF CK (CPK): CPT

## 2018-01-01 PROCEDURE — 04HK33Z INSERTION OF INFUSION DEVICE INTO RIGHT FEMORAL ARTERY, PERCUTANEOUS APPROACH: ICD-10-PCS | Performed by: INTERNAL MEDICINE

## 2018-01-01 PROCEDURE — 95822 EEG COMA OR SLEEP ONLY: CPT | Performed by: PSYCHIATRY & NEUROLOGY

## 2018-01-01 PROCEDURE — 87899 AGENT NOS ASSAY W/OPTIC: CPT

## 2018-01-01 PROCEDURE — 95819 EEG AWAKE AND ASLEEP: CPT

## 2018-01-01 PROCEDURE — 74018 RADEX ABDOMEN 1 VIEW: CPT

## 2018-01-01 PROCEDURE — 87641 MR-STAPH DNA AMP PROBE: CPT

## 2018-01-01 PROCEDURE — 85018 HEMOGLOBIN: CPT

## 2018-01-01 PROCEDURE — 83880 ASSAY OF NATRIURETIC PEPTIDE: CPT

## 2018-01-01 PROCEDURE — 90937 HEMODIALYSIS REPEATED EVAL: CPT

## 2018-01-01 PROCEDURE — 99291 CRITICAL CARE FIRST HOUR: CPT | Performed by: PSYCHIATRY & NEUROLOGY

## 2018-01-01 RX ORDER — DEXTROSE AND SODIUM CHLORIDE 5; .45 G/100ML; G/100ML
INJECTION, SOLUTION INTRAVENOUS CONTINUOUS
Status: DISCONTINUED | OUTPATIENT
Start: 2018-01-01 | End: 2018-01-01

## 2018-01-01 RX ORDER — METOPROLOL TARTRATE 5 MG/5ML
5 INJECTION INTRAVENOUS ONCE
Status: COMPLETED | OUTPATIENT
Start: 2018-01-01 | End: 2018-01-01

## 2018-01-01 RX ORDER — ALBUTEROL SULFATE 2.5 MG/3ML
2.5 SOLUTION RESPIRATORY (INHALATION) 2 TIMES DAILY
Status: DISCONTINUED | OUTPATIENT
Start: 2018-01-01 | End: 2018-01-01

## 2018-01-01 RX ORDER — NICOTINE POLACRILEX 4 MG
15 LOZENGE BUCCAL PRN
Status: DISCONTINUED | OUTPATIENT
Start: 2018-01-01 | End: 2018-01-01

## 2018-01-01 RX ORDER — LISINOPRIL 10 MG/1
5 TABLET ORAL
COMMUNITY

## 2018-01-01 RX ORDER — 0.9 % SODIUM CHLORIDE 0.9 %
500 INTRAVENOUS SOLUTION INTRAVENOUS ONCE
Status: DISCONTINUED | OUTPATIENT
Start: 2018-01-01 | End: 2018-01-01

## 2018-01-01 RX ORDER — METOPROLOL TARTRATE 5 MG/5ML
INJECTION INTRAVENOUS
Status: COMPLETED
Start: 2018-01-01 | End: 2018-01-01

## 2018-01-01 RX ORDER — FLUCONAZOLE 2 MG/ML
200 INJECTION, SOLUTION INTRAVENOUS EVERY 24 HOURS
Status: DISCONTINUED | OUTPATIENT
Start: 2018-01-01 | End: 2018-01-01

## 2018-01-01 RX ORDER — CLOPIDOGREL BISULFATE 75 MG/1
TABLET ORAL
Refills: 6 | COMMUNITY
Start: 2018-01-01

## 2018-01-01 RX ORDER — FENTANYL CITRATE 50 UG/ML
50 INJECTION, SOLUTION INTRAMUSCULAR; INTRAVENOUS
Status: DISCONTINUED | OUTPATIENT
Start: 2018-01-01 | End: 2018-01-01

## 2018-01-01 RX ORDER — MORPHINE SULFATE 2 MG/ML
4 INJECTION, SOLUTION INTRAMUSCULAR; INTRAVENOUS
Status: DISCONTINUED | OUTPATIENT
Start: 2018-01-01 | End: 2018-01-01 | Stop reason: HOSPADM

## 2018-01-01 RX ORDER — DEXTROSE AND SODIUM CHLORIDE 5; .9 G/100ML; G/100ML
INJECTION, SOLUTION INTRAVENOUS CONTINUOUS
Status: DISCONTINUED | OUTPATIENT
Start: 2018-01-01 | End: 2018-01-01

## 2018-01-01 RX ORDER — CINACALCET 30 MG/1
30 TABLET, FILM COATED ORAL
COMMUNITY

## 2018-01-01 RX ORDER — PANTOPRAZOLE SODIUM 40 MG/10ML
40 INJECTION, POWDER, LYOPHILIZED, FOR SOLUTION INTRAVENOUS 2 TIMES DAILY
Status: DISCONTINUED | OUTPATIENT
Start: 2018-01-01 | End: 2018-01-01

## 2018-01-01 RX ORDER — SODIUM CHLORIDE 0.9 % (FLUSH) 0.9 %
10 SYRINGE (ML) INJECTION EVERY 12 HOURS SCHEDULED
Status: DISCONTINUED | OUTPATIENT
Start: 2018-01-01 | End: 2018-01-01

## 2018-01-01 RX ORDER — DEXTROSE MONOHYDRATE 25 G/50ML
12.5 INJECTION, SOLUTION INTRAVENOUS PRN
Status: DISCONTINUED | OUTPATIENT
Start: 2018-01-01 | End: 2018-01-01

## 2018-01-01 RX ORDER — ATROPINE SULFATE 10 MG/G
OINTMENT OPHTHALMIC 3 TIMES DAILY
Status: DISCONTINUED | OUTPATIENT
Start: 2018-01-01 | End: 2018-01-01

## 2018-01-01 RX ORDER — MORPHINE SULFATE 4 MG/ML
4 INJECTION, SOLUTION INTRAMUSCULAR; INTRAVENOUS EVERY 4 HOURS PRN
Status: DISCONTINUED | OUTPATIENT
Start: 2018-01-01 | End: 2018-01-01

## 2018-01-01 RX ORDER — ONDANSETRON 2 MG/ML
4 INJECTION INTRAMUSCULAR; INTRAVENOUS EVERY 6 HOURS PRN
Status: DISCONTINUED | OUTPATIENT
Start: 2018-01-01 | End: 2018-01-01

## 2018-01-01 RX ORDER — POTASSIUM CHLORIDE 7.45 MG/ML
10 INJECTION INTRAVENOUS PRN
Status: DISCONTINUED | OUTPATIENT
Start: 2018-01-01 | End: 2018-01-01

## 2018-01-01 RX ORDER — DEXTROSE MONOHYDRATE 25 G/50ML
INJECTION, SOLUTION INTRAVENOUS
Status: COMPLETED
Start: 2018-01-01 | End: 2018-01-01

## 2018-01-01 RX ORDER — ASPIRIN 600 MG/1
600 SUPPOSITORY RECTAL ONCE
Status: COMPLETED | OUTPATIENT
Start: 2018-01-01 | End: 2018-01-01

## 2018-01-01 RX ORDER — HEPARIN SODIUM 5000 [USP'U]/ML
5000 INJECTION, SOLUTION INTRAVENOUS; SUBCUTANEOUS EVERY 8 HOURS SCHEDULED
Status: DISCONTINUED | OUTPATIENT
Start: 2018-01-01 | End: 2018-01-01

## 2018-01-01 RX ORDER — SODIUM CHLORIDE 9 MG/ML
INJECTION, SOLUTION INTRAVENOUS CONTINUOUS
Status: DISCONTINUED | OUTPATIENT
Start: 2018-01-01 | End: 2018-01-01

## 2018-01-01 RX ORDER — PROPOFOL 10 MG/ML
10 INJECTION, EMULSION INTRAVENOUS ONCE
Status: COMPLETED | OUTPATIENT
Start: 2018-01-01 | End: 2018-01-01

## 2018-01-01 RX ORDER — MIDAZOLAM HYDROCHLORIDE 1 MG/ML
1 INJECTION INTRAMUSCULAR; INTRAVENOUS EVERY 4 HOURS PRN
Status: DISCONTINUED | OUTPATIENT
Start: 2018-01-01 | End: 2018-01-01

## 2018-01-01 RX ORDER — MONTELUKAST SODIUM 10 MG/1
10 TABLET ORAL
COMMUNITY

## 2018-01-01 RX ORDER — NITROGLYCERIN 0.4 MG/1
0.4 TABLET SUBLINGUAL
COMMUNITY
Start: 2018-01-01 | End: 2018-04-12

## 2018-01-01 RX ORDER — MIDAZOLAM HYDROCHLORIDE 1 MG/ML
2 INJECTION INTRAMUSCULAR; INTRAVENOUS EVERY 30 MIN PRN
Status: DISCONTINUED | OUTPATIENT
Start: 2018-01-01 | End: 2018-01-01 | Stop reason: HOSPADM

## 2018-01-01 RX ORDER — ALBUTEROL SULFATE 2.5 MG/3ML
2.5 SOLUTION RESPIRATORY (INHALATION) EVERY 4 HOURS PRN
Status: DISCONTINUED | OUTPATIENT
Start: 2018-01-01 | End: 2018-01-01

## 2018-01-01 RX ORDER — 0.9 % SODIUM CHLORIDE 0.9 %
500 INTRAVENOUS SOLUTION INTRAVENOUS ONCE
Status: COMPLETED | OUTPATIENT
Start: 2018-01-01 | End: 2018-01-01

## 2018-01-01 RX ORDER — FENTANYL CITRATE 50 UG/ML
50 INJECTION, SOLUTION INTRAMUSCULAR; INTRAVENOUS ONCE
Status: DISCONTINUED | OUTPATIENT
Start: 2018-01-01 | End: 2018-01-01

## 2018-01-01 RX ORDER — DEXTROSE MONOHYDRATE 25 G/50ML
25 INJECTION, SOLUTION INTRAVENOUS ONCE
Status: COMPLETED | OUTPATIENT
Start: 2018-01-01 | End: 2018-01-01

## 2018-01-01 RX ORDER — FAMOTIDINE 20 MG/1
20 TABLET, FILM COATED ORAL DAILY
Status: DISCONTINUED | OUTPATIENT
Start: 2018-01-01 | End: 2018-01-01

## 2018-01-01 RX ORDER — PANTOPRAZOLE SODIUM 40 MG/10ML
40 INJECTION, POWDER, LYOPHILIZED, FOR SOLUTION INTRAVENOUS DAILY
Status: DISCONTINUED | OUTPATIENT
Start: 2018-01-01 | End: 2018-01-01

## 2018-01-01 RX ORDER — DEXTROSE MONOHYDRATE 50 MG/ML
100 INJECTION, SOLUTION INTRAVENOUS PRN
Status: DISCONTINUED | OUTPATIENT
Start: 2018-01-01 | End: 2018-01-01 | Stop reason: SDUPTHER

## 2018-01-01 RX ORDER — MORPHINE SULFATE 2 MG/ML
2 INJECTION, SOLUTION INTRAMUSCULAR; INTRAVENOUS EVERY 4 HOURS PRN
Status: DISCONTINUED | OUTPATIENT
Start: 2018-01-01 | End: 2018-01-01

## 2018-01-01 RX ORDER — ASPIRIN 81 MG/1
81 TABLET, CHEWABLE ORAL
COMMUNITY
Start: 2018-01-01 | End: 2018-04-13

## 2018-01-01 RX ORDER — PROPOFOL 10 MG/ML
INJECTION, EMULSION INTRAVENOUS
Status: DISCONTINUED
Start: 2018-01-01 | End: 2018-01-01 | Stop reason: HOSPADM

## 2018-01-01 RX ORDER — SODIUM CHLORIDE 0.9 % (FLUSH) 0.9 %
10 SYRINGE (ML) INJECTION PRN
Status: DISCONTINUED | OUTPATIENT
Start: 2018-01-01 | End: 2018-01-01

## 2018-01-01 RX ORDER — GLIMEPIRIDE 4 MG/1
4 TABLET ORAL
COMMUNITY

## 2018-01-01 RX ORDER — LIDOCAINE AND PRILOCAINE 25; 25 MG/G; MG/G
CREAM TOPICAL
Refills: 10 | COMMUNITY
Start: 2018-01-01

## 2018-01-01 RX ORDER — CARVEDILOL 6.25 MG/1
6.25 TABLET ORAL 2 TIMES DAILY WITH MEALS
Status: DISCONTINUED | OUTPATIENT
Start: 2018-01-01 | End: 2018-01-01

## 2018-01-01 RX ORDER — FLUCONAZOLE 2 MG/ML
200 INJECTION, SOLUTION INTRAVENOUS
Status: DISCONTINUED | OUTPATIENT
Start: 2018-01-01 | End: 2018-01-01

## 2018-01-01 RX ORDER — ACETAMINOPHEN 325 MG/1
650 TABLET ORAL EVERY 4 HOURS PRN
Status: DISCONTINUED | OUTPATIENT
Start: 2018-01-01 | End: 2018-01-01 | Stop reason: SDUPTHER

## 2018-01-01 RX ORDER — CLOPIDOGREL BISULFATE 75 MG/1
75 TABLET ORAL DAILY
Status: DISCONTINUED | OUTPATIENT
Start: 2018-01-01 | End: 2018-01-01

## 2018-01-01 RX ORDER — CALCIUM GLUCONATE 94 MG/ML
1 INJECTION, SOLUTION INTRAVENOUS ONCE
Status: DISCONTINUED | OUTPATIENT
Start: 2018-01-01 | End: 2018-01-01 | Stop reason: DRUGHIGH

## 2018-01-01 RX ORDER — LORATADINE 10 MG/1
TABLET ORAL
Refills: 1 | COMMUNITY
Start: 2018-01-01

## 2018-01-01 RX ORDER — OXYCODONE HYDROCHLORIDE AND ACETAMINOPHEN 5; 325 MG/1; MG/1
TABLET ORAL
COMMUNITY
Start: 2018-01-01

## 2018-01-01 RX ORDER — MORPHINE SULFATE 4 MG/ML
4 INJECTION, SOLUTION INTRAMUSCULAR; INTRAVENOUS ONCE
Status: COMPLETED | OUTPATIENT
Start: 2018-01-01 | End: 2018-01-01

## 2018-01-01 RX ORDER — IPRATROPIUM BROMIDE AND ALBUTEROL SULFATE 2.5; .5 MG/3ML; MG/3ML
1 SOLUTION RESPIRATORY (INHALATION) EVERY 6 HOURS
Status: DISCONTINUED | OUTPATIENT
Start: 2018-01-01 | End: 2018-01-01

## 2018-01-01 RX ORDER — CARVEDILOL 12.5 MG/1
12.5 TABLET ORAL
COMMUNITY
Start: 2017-01-01

## 2018-01-01 RX ORDER — ACETAMINOPHEN 650 MG/1
650 SUPPOSITORY RECTAL ONCE
Status: COMPLETED | OUTPATIENT
Start: 2018-01-01 | End: 2018-01-01

## 2018-01-01 RX ORDER — GABAPENTIN 100 MG/1
100 CAPSULE ORAL
COMMUNITY

## 2018-01-01 RX ORDER — MAGNESIUM SULFATE 1 G/100ML
1 INJECTION INTRAVENOUS
Status: ACTIVE | OUTPATIENT
Start: 2018-01-01 | End: 2018-01-01

## 2018-01-01 RX ORDER — ALLOPURINOL 100 MG/1
TABLET ORAL
Refills: 1 | COMMUNITY
Start: 2018-01-01

## 2018-01-01 RX ORDER — ALBUTEROL SULFATE 2.5 MG/3ML
2.5 SOLUTION RESPIRATORY (INHALATION)
Status: DISCONTINUED | OUTPATIENT
Start: 2018-01-01 | End: 2018-01-01

## 2018-01-01 RX ORDER — ACETAMINOPHEN 325 MG/1
650 TABLET ORAL EVERY 4 HOURS PRN
Status: DISCONTINUED | OUTPATIENT
Start: 2018-01-01 | End: 2018-01-01 | Stop reason: HOSPADM

## 2018-01-01 RX ORDER — MORPHINE SULFATE 2 MG/ML
INJECTION, SOLUTION INTRAMUSCULAR; INTRAVENOUS
Status: DISCONTINUED
Start: 2018-01-01 | End: 2018-01-01 | Stop reason: HOSPADM

## 2018-01-01 RX ORDER — CARVEDILOL 3.12 MG/1
3.12 TABLET ORAL 2 TIMES DAILY WITH MEALS
Status: DISCONTINUED | OUTPATIENT
Start: 2018-01-01 | End: 2018-01-01

## 2018-01-01 RX ORDER — IPRATROPIUM BROMIDE AND ALBUTEROL SULFATE 2.5; .5 MG/3ML; MG/3ML
1 SOLUTION RESPIRATORY (INHALATION) 4 TIMES DAILY
Status: DISCONTINUED | OUTPATIENT
Start: 2018-01-01 | End: 2018-01-01

## 2018-01-01 RX ORDER — LORAZEPAM 2 MG/ML
1 INJECTION INTRAMUSCULAR
Status: DISCONTINUED | OUTPATIENT
Start: 2018-01-01 | End: 2018-01-01 | Stop reason: HOSPADM

## 2018-01-01 RX ORDER — CALCIUM ACETATE 667 MG/1
CAPSULE ORAL
Refills: 3 | COMMUNITY
Start: 2018-01-01

## 2018-01-01 RX ORDER — MORPHINE SULFATE 2 MG/ML
2 INJECTION, SOLUTION INTRAMUSCULAR; INTRAVENOUS ONCE
Status: COMPLETED | OUTPATIENT
Start: 2018-01-01 | End: 2018-01-01

## 2018-01-01 RX ORDER — LORAZEPAM 2 MG/ML
INJECTION INTRAMUSCULAR
Status: COMPLETED
Start: 2018-01-01 | End: 2018-01-01

## 2018-01-01 RX ORDER — PROPOFOL 10 MG/ML
10 INJECTION, EMULSION INTRAVENOUS
Status: DISCONTINUED | OUTPATIENT
Start: 2018-01-01 | End: 2018-01-01

## 2018-01-01 RX ORDER — LORAZEPAM 2 MG/ML
2 INJECTION INTRAMUSCULAR ONCE
Status: COMPLETED | OUTPATIENT
Start: 2018-01-01 | End: 2018-01-01

## 2018-01-01 RX ORDER — NICOTINE POLACRILEX 4 MG
15 LOZENGE BUCCAL PRN
Status: DISCONTINUED | OUTPATIENT
Start: 2018-01-01 | End: 2018-01-01 | Stop reason: SDUPTHER

## 2018-01-01 RX ORDER — DEXTROSE MONOHYDRATE 50 MG/ML
100 INJECTION, SOLUTION INTRAVENOUS PRN
Status: DISCONTINUED | OUTPATIENT
Start: 2018-01-01 | End: 2018-01-01

## 2018-01-01 RX ORDER — POTASSIUM CHLORIDE 29.8 MG/ML
20 INJECTION INTRAVENOUS PRN
Status: DISCONTINUED | OUTPATIENT
Start: 2018-01-01 | End: 2018-01-01

## 2018-01-01 RX ORDER — GLYCOPYRROLATE 0.2 MG/ML
0.3 INJECTION INTRAMUSCULAR; INTRAVENOUS EVERY 4 HOURS PRN
Status: DISCONTINUED | OUTPATIENT
Start: 2018-01-01 | End: 2018-01-01

## 2018-01-01 RX ORDER — ATROPINE SULFATE 10 MG/ML
2 SOLUTION/ DROPS OPHTHALMIC EVERY 4 HOURS PRN
Status: DISCONTINUED | OUTPATIENT
Start: 2018-01-01 | End: 2018-01-01

## 2018-01-01 RX ORDER — SODIUM CHLORIDE 0.9 % (FLUSH) 0.9 %
10 SYRINGE (ML) INJECTION EVERY 12 HOURS SCHEDULED
Status: DISCONTINUED | OUTPATIENT
Start: 2018-01-01 | End: 2018-01-01 | Stop reason: HOSPADM

## 2018-01-01 RX ORDER — LORAZEPAM 2 MG/ML
2 INJECTION INTRAMUSCULAR
Status: DISCONTINUED | OUTPATIENT
Start: 2018-01-01 | End: 2018-01-01 | Stop reason: HOSPADM

## 2018-01-01 RX ORDER — ATORVASTATIN CALCIUM 80 MG/1
80 TABLET, FILM COATED ORAL
COMMUNITY
Start: 2018-01-01

## 2018-01-01 RX ORDER — MAGNESIUM SULFATE 1 G/100ML
1 INJECTION INTRAVENOUS PRN
Status: DISCONTINUED | OUTPATIENT
Start: 2018-01-01 | End: 2018-01-01

## 2018-01-01 RX ORDER — DEXTROSE MONOHYDRATE 25 G/50ML
12.5 INJECTION, SOLUTION INTRAVENOUS PRN
Status: DISCONTINUED | OUTPATIENT
Start: 2018-01-01 | End: 2018-01-01 | Stop reason: SDUPTHER

## 2018-01-01 RX ORDER — MORPHINE SULFATE 2 MG/ML
2 INJECTION, SOLUTION INTRAMUSCULAR; INTRAVENOUS
Status: DISCONTINUED | OUTPATIENT
Start: 2018-01-01 | End: 2018-01-01 | Stop reason: HOSPADM

## 2018-01-01 RX ORDER — CHLORHEXIDINE GLUCONATE 0.12 MG/ML
15 RINSE ORAL 2 TIMES DAILY
Status: DISCONTINUED | OUTPATIENT
Start: 2018-01-01 | End: 2018-01-01

## 2018-01-01 RX ORDER — ASPIRIN 81 MG/1
81 TABLET, CHEWABLE ORAL DAILY
Status: DISCONTINUED | OUTPATIENT
Start: 2018-01-01 | End: 2018-01-01

## 2018-01-01 RX ORDER — DEXTROSE MONOHYDRATE 100 MG/ML
INJECTION, SOLUTION INTRAVENOUS CONTINUOUS
Status: DISCONTINUED | OUTPATIENT
Start: 2018-01-01 | End: 2018-01-01

## 2018-01-01 RX ORDER — GLYCOPYRROLATE 0.2 MG/ML
0.2 INJECTION INTRAMUSCULAR; INTRAVENOUS EVERY 4 HOURS PRN
Status: DISCONTINUED | OUTPATIENT
Start: 2018-01-01 | End: 2018-01-01 | Stop reason: HOSPADM

## 2018-01-01 RX ORDER — MAGNESIUM HYDROXIDE 1200 MG/15ML
LIQUID ORAL
Status: COMPLETED
Start: 2018-01-01 | End: 2018-01-01

## 2018-01-01 RX ADMIN — ALBUTEROL SULFATE 2.5 MG: 2.5 SOLUTION RESPIRATORY (INHALATION) at 03:30

## 2018-01-01 RX ADMIN — CHLORHEXIDINE GLUCONATE 15 ML: 1.2 RINSE ORAL at 22:29

## 2018-01-01 RX ADMIN — INSULIN LISPRO 1 UNITS: 100 INJECTION, SOLUTION INTRAVENOUS; SUBCUTANEOUS at 17:16

## 2018-01-01 RX ADMIN — Medication 2 MG/HR: at 17:12

## 2018-01-01 RX ADMIN — IPRATROPIUM BROMIDE AND ALBUTEROL SULFATE 1 AMPULE: .5; 3 SOLUTION RESPIRATORY (INHALATION) at 09:41

## 2018-01-01 RX ADMIN — GLYCOPYRROLATE 0.2 MG: 0.2 INJECTION INTRAMUSCULAR; INTRAVENOUS at 21:02

## 2018-01-01 RX ADMIN — ACYCLOVIR SODIUM 755 MG: 50 INJECTION, SOLUTION INTRAVENOUS at 13:43

## 2018-01-01 RX ADMIN — CLOPIDOGREL 75 MG: 75 TABLET, FILM COATED ORAL at 08:22

## 2018-01-01 RX ADMIN — CEFEPIME HYDROCHLORIDE 1 G: 2 INJECTION, POWDER, FOR SOLUTION INTRAVENOUS at 18:07

## 2018-01-01 RX ADMIN — HEPARIN SODIUM 5000 UNITS: 5000 INJECTION, SOLUTION INTRAVENOUS; SUBCUTANEOUS at 06:38

## 2018-01-01 RX ADMIN — FAMOTIDINE 20 MG: 20 TABLET, FILM COATED ORAL at 08:22

## 2018-01-01 RX ADMIN — CALCIUM GLUCONATE 2 G: 94 INJECTION, SOLUTION INTRAVENOUS at 03:51

## 2018-01-01 RX ADMIN — Medication 2 MG/HR: at 15:30

## 2018-01-01 RX ADMIN — DEXTROSE MONOHYDRATE 25 G: 25 INJECTION, SOLUTION INTRAVENOUS at 14:09

## 2018-01-01 RX ADMIN — SODIUM CHLORIDE: 9 INJECTION, SOLUTION INTRAVENOUS at 22:19

## 2018-01-01 RX ADMIN — HEPARIN SODIUM 5000 UNITS: 5000 INJECTION, SOLUTION INTRAVENOUS; SUBCUTANEOUS at 21:36

## 2018-01-01 RX ADMIN — CLOPIDOGREL 75 MG: 75 TABLET, FILM COATED ORAL at 08:21

## 2018-01-01 RX ADMIN — VANCOMYCIN HYDROCHLORIDE 750 MG: 1 INJECTION, POWDER, LYOPHILIZED, FOR SOLUTION INTRAVENOUS at 08:20

## 2018-01-01 RX ADMIN — IPRATROPIUM BROMIDE AND ALBUTEROL SULFATE 1 AMPULE: .5; 3 SOLUTION RESPIRATORY (INHALATION) at 12:31

## 2018-01-01 RX ADMIN — DEXTROSE MONOHYDRATE 25 G: 25 INJECTION, SOLUTION INTRAVENOUS at 13:20

## 2018-01-01 RX ADMIN — ASPIRIN 81 MG: 81 TABLET, CHEWABLE ORAL at 08:22

## 2018-01-01 RX ADMIN — FLUCONAZOLE 200 MG: 2 INJECTION, SOLUTION INTRAVENOUS at 17:39

## 2018-01-01 RX ADMIN — NOREPINEPHRINE BITARTRATE 5 MCG/MIN: 1 INJECTION, SOLUTION, CONCENTRATE INTRAVENOUS at 13:30

## 2018-01-01 RX ADMIN — IPRATROPIUM BROMIDE AND ALBUTEROL SULFATE 1 AMPULE: .5; 3 SOLUTION RESPIRATORY (INHALATION) at 19:59

## 2018-01-01 RX ADMIN — DEXTROSE MONOHYDRATE: 100 INJECTION, SOLUTION INTRAVENOUS at 09:31

## 2018-01-01 RX ADMIN — ATROPINE SULFATE 2 DROP: 10 SOLUTION/ DROPS OPHTHALMIC at 12:44

## 2018-01-01 RX ADMIN — DEXTROSE AND SODIUM CHLORIDE: 5; 450 INJECTION, SOLUTION INTRAVENOUS at 21:46

## 2018-01-01 RX ADMIN — SODIUM CHLORIDE 1.5 MCG/KG/MIN: 9 INJECTION, SOLUTION INTRAVENOUS at 07:18

## 2018-01-01 RX ADMIN — Medication 10 ML: at 20:36

## 2018-01-01 RX ADMIN — METOPROLOL TARTRATE 5 MG: 1 INJECTION, SOLUTION INTRAVENOUS at 20:44

## 2018-01-01 RX ADMIN — FAMOTIDINE 20 MG: 10 INJECTION INTRAVENOUS at 22:27

## 2018-01-01 RX ADMIN — PROPOFOL 10 MCG/KG/MIN: 10 INJECTION, EMULSION INTRAVENOUS at 20:50

## 2018-01-01 RX ADMIN — GLYCOPYRROLATE 0.2 MG: 0.2 INJECTION INTRAMUSCULAR; INTRAVENOUS at 01:11

## 2018-01-01 RX ADMIN — CARVEDILOL 6.25 MG: 6.25 TABLET, FILM COATED ORAL at 17:42

## 2018-01-01 RX ADMIN — FLUCONAZOLE 200 MG: 2 INJECTION, SOLUTION INTRAVENOUS at 14:16

## 2018-01-01 RX ADMIN — MORPHINE SULFATE 4 MG: 2 INJECTION, SOLUTION INTRAMUSCULAR; INTRAVENOUS at 07:58

## 2018-01-01 RX ADMIN — CALCIUM CHLORIDE 1 G: 100 INJECTION, SOLUTION INTRAVENOUS; INTRAVENTRICULAR at 15:14

## 2018-01-01 RX ADMIN — ALTEPLASE 1 MG: 2.2 INJECTION, POWDER, LYOPHILIZED, FOR SOLUTION INTRAVENOUS at 05:48

## 2018-01-01 RX ADMIN — FAMOTIDINE 20 MG: 10 INJECTION INTRAVENOUS at 08:48

## 2018-01-01 RX ADMIN — METOPROLOL TARTRATE 5 MG: 5 INJECTION INTRAVENOUS at 20:47

## 2018-01-01 RX ADMIN — HEPARIN SODIUM 5000 UNITS: 5000 INJECTION, SOLUTION INTRAVENOUS; SUBCUTANEOUS at 22:38

## 2018-01-01 RX ADMIN — MORPHINE SULFATE 4 MG: 2 INJECTION, SOLUTION INTRAMUSCULAR; INTRAVENOUS at 21:10

## 2018-01-01 RX ADMIN — PROPOFOL 10 MCG/KG/MIN: 10 INJECTION, EMULSION INTRAVENOUS at 06:51

## 2018-01-01 RX ADMIN — HYDROCORTISONE SODIUM SUCCINATE 100 MG: 100 INJECTION, POWDER, FOR SOLUTION INTRAMUSCULAR; INTRAVENOUS at 22:47

## 2018-01-01 RX ADMIN — MORPHINE SULFATE 4 MG: 2 INJECTION, SOLUTION INTRAMUSCULAR; INTRAVENOUS at 01:12

## 2018-01-01 RX ADMIN — MORPHINE SULFATE 4 MG: 4 INJECTION INTRAVENOUS at 11:41

## 2018-01-01 RX ADMIN — LORAZEPAM 2 MG: 2 INJECTION INTRAMUSCULAR; INTRAVENOUS at 16:53

## 2018-01-01 RX ADMIN — PANTOPRAZOLE SODIUM 40 MG: 40 INJECTION, POWDER, FOR SOLUTION INTRAVENOUS at 20:36

## 2018-01-01 RX ADMIN — PROPOFOL 7.5 MCG/KG/MIN: 10 INJECTION, EMULSION INTRAVENOUS at 21:14

## 2018-01-01 RX ADMIN — CALCIUM CHLORIDE 1 G: 100 INJECTION, SOLUTION INTRAVENOUS; INTRAVENTRICULAR at 20:08

## 2018-01-01 RX ADMIN — DEXTROSE MONOHYDRATE: 100 INJECTION, SOLUTION INTRAVENOUS at 00:45

## 2018-01-01 RX ADMIN — SODIUM CHLORIDE: 9 INJECTION, SOLUTION INTRAVENOUS at 21:14

## 2018-01-01 RX ADMIN — CARVEDILOL 6.25 MG: 6.25 TABLET, FILM COATED ORAL at 12:04

## 2018-01-01 RX ADMIN — LORAZEPAM 2 MG: 2 INJECTION INTRAMUSCULAR; INTRAVENOUS at 11:41

## 2018-01-01 RX ADMIN — GLYCOPYRROLATE 0.2 MG: 0.2 INJECTION INTRAMUSCULAR; INTRAVENOUS at 05:11

## 2018-01-01 RX ADMIN — DEXTROSE MONOHYDRATE: 100 INJECTION, SOLUTION INTRAVENOUS at 16:56

## 2018-01-01 RX ADMIN — MORPHINE SULFATE 4 MG: 2 INJECTION, SOLUTION INTRAMUSCULAR; INTRAVENOUS at 11:23

## 2018-01-01 RX ADMIN — METOPROLOL TARTRATE 5 MG: 5 INJECTION INTRAVENOUS at 20:44

## 2018-01-01 RX ADMIN — PANTOPRAZOLE SODIUM 40 MG: 40 INJECTION, POWDER, FOR SOLUTION INTRAVENOUS at 08:21

## 2018-01-01 RX ADMIN — SODIUM CHLORIDE 1000 ML: 900 IRRIGANT IRRIGATION at 12:00

## 2018-01-01 RX ADMIN — SODIUM CHLORIDE 1.5 MCG/KG/MIN: 9 INJECTION, SOLUTION INTRAVENOUS at 03:05

## 2018-01-01 RX ADMIN — LORAZEPAM 2 MG: 2 INJECTION INTRAMUSCULAR at 11:41

## 2018-01-01 RX ADMIN — CALCIUM GLUCONATE 2 G: 98 INJECTION, SOLUTION INTRAVENOUS at 06:26

## 2018-01-01 RX ADMIN — IPRATROPIUM BROMIDE AND ALBUTEROL SULFATE 1 AMPULE: .5; 3 SOLUTION RESPIRATORY (INHALATION) at 20:44

## 2018-01-01 RX ADMIN — IPRATROPIUM BROMIDE AND ALBUTEROL SULFATE 1 AMPULE: .5; 3 SOLUTION RESPIRATORY (INHALATION) at 15:22

## 2018-01-01 RX ADMIN — PROPOFOL 10 MCG/KG/MIN: 10 INJECTION, EMULSION INTRAVENOUS at 11:33

## 2018-01-01 RX ADMIN — MORPHINE SULFATE 2 MG: 2 INJECTION, SOLUTION INTRAMUSCULAR; INTRAVENOUS at 03:35

## 2018-01-01 RX ADMIN — CEFTRIAXONE SODIUM 1 G: 1 INJECTION, POWDER, FOR SOLUTION INTRAMUSCULAR; INTRAVENOUS at 15:54

## 2018-01-01 RX ADMIN — Medication 10 ML: at 08:21

## 2018-01-01 RX ADMIN — MORPHINE SULFATE 2 MG: 2 INJECTION, SOLUTION INTRAMUSCULAR; INTRAVENOUS at 10:35

## 2018-01-01 RX ADMIN — IPRATROPIUM BROMIDE AND ALBUTEROL SULFATE 1 AMPULE: .5; 3 SOLUTION RESPIRATORY (INHALATION) at 02:39

## 2018-01-01 RX ADMIN — SODIUM CHLORIDE: 9 INJECTION, SOLUTION INTRAVENOUS at 17:09

## 2018-01-01 RX ADMIN — FENTANYL CITRATE 50 MCG: 50 INJECTION INTRAMUSCULAR; INTRAVENOUS at 15:30

## 2018-01-01 RX ADMIN — ALBUTEROL SULFATE 2.5 MG: 2.5 SOLUTION RESPIRATORY (INHALATION) at 23:52

## 2018-01-01 RX ADMIN — DEXTROSE AND SODIUM CHLORIDE: 5; 900 INJECTION, SOLUTION INTRAVENOUS at 06:45

## 2018-01-01 RX ADMIN — SODIUM CHLORIDE: 9 INJECTION, SOLUTION INTRAVENOUS at 06:00

## 2018-01-01 RX ADMIN — Medication 10 ML: at 22:28

## 2018-01-01 RX ADMIN — Medication 10 ML: at 08:22

## 2018-01-01 RX ADMIN — ACETAMINOPHEN 650 MG: 650 SUPPOSITORY RECTAL at 14:56

## 2018-01-01 RX ADMIN — DEXTROSE MONOHYDRATE 25 G: 500 INJECTION PARENTERAL at 00:10

## 2018-01-01 RX ADMIN — MORPHINE SULFATE 2 MG: 2 INJECTION, SOLUTION INTRAMUSCULAR; INTRAVENOUS at 10:18

## 2018-01-01 RX ADMIN — Medication 10 ML: at 20:05

## 2018-01-01 RX ADMIN — ASPIRIN 81 MG: 81 TABLET, CHEWABLE ORAL at 08:21

## 2018-01-01 RX ADMIN — Medication 1500 MG: at 01:29

## 2018-01-01 RX ADMIN — INSULIN LISPRO 1 UNITS: 100 INJECTION, SOLUTION INTRAVENOUS; SUBCUTANEOUS at 12:06

## 2018-01-01 RX ADMIN — CEFEPIME HYDROCHLORIDE 1 G: 2 INJECTION, POWDER, FOR SOLUTION INTRAVENOUS at 17:42

## 2018-01-01 RX ADMIN — Medication 10 ML: at 08:49

## 2018-01-01 RX ADMIN — MAGNESIUM SULFATE HEPTAHYDRATE 1 G: 1 INJECTION, SOLUTION INTRAVENOUS at 16:37

## 2018-01-01 RX ADMIN — CEFEPIME HYDROCHLORIDE 1 G: 2 INJECTION, POWDER, FOR SOLUTION INTRAVENOUS at 22:53

## 2018-01-01 RX ADMIN — SODIUM CHLORIDE: 9 INJECTION, SOLUTION INTRAVENOUS at 17:22

## 2018-01-01 RX ADMIN — IPRATROPIUM BROMIDE AND ALBUTEROL SULFATE 1 AMPULE: .5; 3 SOLUTION RESPIRATORY (INHALATION) at 07:54

## 2018-01-01 RX ADMIN — METOPROLOL TARTRATE 5 MG: 1 INJECTION, SOLUTION INTRAVENOUS at 20:47

## 2018-01-01 RX ADMIN — IPRATROPIUM BROMIDE AND ALBUTEROL SULFATE 1 AMPULE: .5; 3 SOLUTION RESPIRATORY (INHALATION) at 15:48

## 2018-01-01 RX ADMIN — SODIUM CHLORIDE: 9 INJECTION, SOLUTION INTRAVENOUS at 05:45

## 2018-01-01 RX ADMIN — ACYCLOVIR SODIUM 755 MG: 50 INJECTION, SOLUTION INTRAVENOUS at 12:04

## 2018-01-01 RX ADMIN — Medication 10 ML: at 20:44

## 2018-01-01 RX ADMIN — DEXTROSE MONOHYDRATE: 100 INJECTION, SOLUTION INTRAVENOUS at 00:27

## 2018-01-01 RX ADMIN — FLUCONAZOLE 200 MG: 2 INJECTION, SOLUTION INTRAVENOUS at 12:14

## 2018-01-01 RX ADMIN — HEPARIN SODIUM 5000 UNITS: 5000 INJECTION, SOLUTION INTRAVENOUS; SUBCUTANEOUS at 15:33

## 2018-01-01 RX ADMIN — GLYCOPYRROLATE 0.2 MG: 0.2 INJECTION INTRAMUSCULAR; INTRAVENOUS at 10:25

## 2018-01-01 RX ADMIN — LORAZEPAM 2 MG: 2 INJECTION INTRAMUSCULAR; INTRAVENOUS at 10:29

## 2018-01-01 RX ADMIN — CALCIUM GLUCONATE 1 G: 98 INJECTION, SOLUTION INTRAVENOUS at 23:00

## 2018-01-01 RX ADMIN — CLOPIDOGREL 75 MG: 75 TABLET, FILM COATED ORAL at 08:48

## 2018-01-01 RX ADMIN — HEPARIN SODIUM 5000 UNITS: 5000 INJECTION, SOLUTION INTRAVENOUS; SUBCUTANEOUS at 05:34

## 2018-01-01 RX ADMIN — PROPOFOL 7.5 MCG/KG/MIN: 10 INJECTION, EMULSION INTRAVENOUS at 06:52

## 2018-01-01 RX ADMIN — MORPHINE SULFATE 4 MG: 2 INJECTION, SOLUTION INTRAMUSCULAR; INTRAVENOUS at 17:18

## 2018-01-01 RX ADMIN — ACYCLOVIR SODIUM 755 MG: 50 INJECTION, SOLUTION INTRAVENOUS at 13:00

## 2018-01-01 RX ADMIN — MORPHINE SULFATE 4 MG: 2 INJECTION, SOLUTION INTRAMUSCULAR; INTRAVENOUS at 12:52

## 2018-01-01 RX ADMIN — SODIUM CHLORIDE 500 ML: 9 INJECTION, SOLUTION INTRAVENOUS at 08:37

## 2018-01-01 RX ADMIN — SODIUM CHLORIDE 1 MCG/KG/MIN: 9 INJECTION, SOLUTION INTRAVENOUS at 21:51

## 2018-01-01 RX ADMIN — MAGNESIUM SULFATE HEPTAHYDRATE 1 G: 1 INJECTION, SOLUTION INTRAVENOUS at 15:15

## 2018-01-01 RX ADMIN — ASPIRIN 600 MG: 600 SUPPOSITORY RECTAL at 12:14

## 2018-01-01 RX ADMIN — HEPARIN SODIUM 5000 UNITS: 5000 INJECTION, SOLUTION INTRAVENOUS; SUBCUTANEOUS at 22:35

## 2018-01-01 RX ADMIN — ASPIRIN 81 MG: 81 TABLET, CHEWABLE ORAL at 08:48

## 2018-01-01 RX ADMIN — HEPARIN SODIUM 5000 UNITS: 5000 INJECTION, SOLUTION INTRAVENOUS; SUBCUTANEOUS at 06:10

## 2018-01-01 RX ADMIN — Medication 40 ML: at 20:16

## 2018-01-01 RX ADMIN — NOREPINEPHRINE BITARTRATE 5 MCG/MIN: 1 INJECTION, SOLUTION, CONCENTRATE INTRAVENOUS at 17:11

## 2018-01-01 RX ADMIN — HEPARIN SODIUM 5000 UNITS: 5000 INJECTION, SOLUTION INTRAVENOUS; SUBCUTANEOUS at 14:16

## 2018-01-01 RX ADMIN — HYDROCORTISONE SODIUM SUCCINATE 100 MG: 100 INJECTION, POWDER, FOR SOLUTION INTRAMUSCULAR; INTRAVENOUS at 05:34

## 2018-01-01 RX ADMIN — MORPHINE SULFATE 4 MG: 2 INJECTION, SOLUTION INTRAMUSCULAR; INTRAVENOUS at 05:11

## 2018-01-01 ASSESSMENT — PAIN SCALES - GENERAL
PAINLEVEL_OUTOF10: 0
PAINLEVEL_OUTOF10: 10
PAINLEVEL_OUTOF10: 0
PAINLEVEL_OUTOF10: 0
PAINLEVEL_OUTOF10: 5
PAINLEVEL_OUTOF10: 0

## 2018-01-01 ASSESSMENT — PULMONARY FUNCTION TESTS
PIF_VALUE: 8
PIF_VALUE: 27
PIF_VALUE: 33
PIF_VALUE: 12
PIF_VALUE: 16
PIF_VALUE: 16
PIF_VALUE: 21
PIF_VALUE: 29
PIF_VALUE: 20
PIF_VALUE: 30
PIF_VALUE: 15
PIF_VALUE: 12
PIF_VALUE: 14
PIF_VALUE: 7
PIF_VALUE: 27
PIF_VALUE: 27
PIF_VALUE: 25
PIF_VALUE: 33
PIF_VALUE: 8
PIF_VALUE: 12
PIF_VALUE: 9
PIF_VALUE: 13
PIF_VALUE: 27
PIF_VALUE: 22
PIF_VALUE: 18
PIF_VALUE: 20
PIF_VALUE: 27
PIF_VALUE: 10
PIF_VALUE: 31
PIF_VALUE: 29
PIF_VALUE: 27
PIF_VALUE: 35
PIF_VALUE: 27
PIF_VALUE: 11

## 2018-03-17 PROBLEM — N10 ACUTE PYELONEPHRITIS: Status: ACTIVE | Noted: 2018-01-01

## 2018-03-17 PROBLEM — N18.6 END STAGE RENAL DISEASE (HCC): Chronic | Status: ACTIVE | Noted: 2018-01-01

## 2018-03-17 PROBLEM — I46.9 CARDIAC ARREST (HCC): Status: ACTIVE | Noted: 2018-01-01

## 2018-03-17 NOTE — ED NOTES
Pt arrived to ED transferred from [facility] AND [facility]. Pt is a post arrest. Pt had a cardiac cath with 2 stents placed 3 days ago at [facility]. Pt was a witnessed arrest at home. When squad arrived pt's glucose was 50. Pt was given an amp of dextrose. Squad provided a 1 round of epi and 1 round of CPR and ROSC returned. Pt arrived to [facility] intubated with a central line- levophed infusing at 5mcg/min. Pt's blood sugar was 36. 1 amp of Dextrose given. Pt in not under any sedation at this time. Levophed increased to 7.5mcg/min at 1403.       Lalita GarciaEncompass Health [facility]  03/17/18 1410

## 2018-03-17 NOTE — PROCEDURES
PROCEDURE NOTE - CENTRAL VENOUS LINE PLACEMENT    PATIENT NAME: Kendall [facility] RECORD NO. [id number]  DATE: 3/17/2018  ATTENDING PHYSICIAN: Dr. Tulio Pruitt DIAGNOSIS:  Need for IV access  POSTOPERATIVE DIAGNOSIS:  Same  PROCEDURE PERFORMED:  Right Femoral Vein Quattro Insertion  PERFORMING PHYSICIAN: Dr. Ritu Osuna, Dr. Geovani Guzman, Dr. Michel Braswell:  Local utilizing 1% lidocaine  ESTIMATED BLOOD LOSS:  Less than 25 ml  COMPLICATIONS:  None immediately appreciated. DISCUSSION:  Dhruv Sousa is a 75y.o.-year-old male who requires central IV access. The history and physical examination were reviewed and confirmed. Procedure was done emergently. The patient was then prepared for the procedure. PROCEDURE:  A timeout was initiated by the bedside nurse and was confirmed by those present. The patient was placed in a supine position. The skin overlying the Right Femoral Vein was prepped with chlorhexadine and draped in sterile fashion. The skin was infiltrated with local anesthetic. The vessel and surrounding anatomy was visualized using ultrasound. Through the anesthetized region, the introducer needle was inserted into the femoral vein returning dark red non pulsatile blood. A guidewire was placed through the center of the needle with no resistance. Ultrasound confirmed presence of wire in the vein. A small incision made in the skin with a #11 scalpel blade. The dilator was inserted into the skin and vein over guidewire using Seldinger technique. The dilator was then removed and the catheter was placed in the vein over the guidewire using Seldinger technique. The guidewire was then removed and all ports aspirated and flushed appropriately. The catheter then secured using silk suture and a temporary sterile dressing was applied. No immediate complication was evident. All sponge, instrument and needle counts were correct at the completion of the procedure.     A post

## 2018-03-17 NOTE — ED PROVIDER NOTES
[facility] ED  Emergency Department  Faculty Sign-Out Addendum     Care of Dhruv Sousa was assumed from previous attending and is being seen for Cardiac Arrest ([address])  . The patient's initial evaluation and plan have been discussed with the prior provider who initially evaluated the patient.       EMERGENCY DEPARTMENT COURSE / MEDICAL DECISION MAKING:       MEDICATIONS GIVEN:  Orders Placed This Encounter   Medications    norepinephrine (LEVOPHED) infusion     NOELLE WINTERS II: cabinet override    dextrose 50 % solution     SanbornNicole: cabinet override    dextrose 50 % solution     HANNUM II, NOELLE: cabinet override    dextrose 50 % solution 25 g    dextrose 50 % solution 25 g    norepinephrine (LEVOPHED) 16 mg in dextrose 5 % 250 mL infusion    acetaminophen (TYLENOL) suppository 650 mg    cefTRIAXone (ROCEPHIN) 1 g in sterile water 10 mL IV syringe       LABS / RADIOLOGY:     Labs Reviewed   CBC WITH AUTO DIFFERENTIAL - Abnormal; Notable for the following:        Result Value    WBC 25.8 (*)     RBC 3.09 (*)     Hemoglobin 9.9 (*)     Hematocrit 32.6 (*)     .5 (*)     Seg Neutrophils 89 (*)     Lymphocytes 3 (*)     Monocytes 8 (*)     Eosinophils % 0 (*)     Segs Absolute 22.97 (*)     Absolute Lymph # 0.77 (*)     Absolute Mono # 2.06 (*)     All other components within normal limits   BASIC METABOLIC PANEL - Abnormal; Notable for the following:     Glucose 46 (*)     BUN 37 (*)     CREATININE 6.91 (*)     Calcium 8.2 (*)     GFR Non- 8 (*)     GFR  10 (*)     All other components within normal limits   HGB/HCT - Abnormal; Notable for the following:     POC Hemoglobin 10.5 (*)     POC Hematocrit 31 (*)     All other components within normal limits   SODIUM (POC) - Abnormal; Notable for the following:     POC Sodium 136 (*)     All other components within normal limits   POTASSIUM (POC) - Abnormal; Notable for the following:     POC Potassium 4.8 (*)     All other components within normal limits   CALCIUM, IONIC (POC) - Abnormal; Notable for the following:     POC Ionized Calcium 1.08 (*)     All other components within normal limits   UA W/REFLEX CULTURE - Abnormal; Notable for the following:     Glucose, Ur 1+ (*)     Bilirubin Urine NEGATIVE  Verified by ictotest. (*)     Ketones, Urine TRACE (*)     Urine Hgb MODERATE (*)     Protein, UA 2+ (*)     Nitrite, Urine POSITIVE (*)     Leukocyte Esterase, Urine MODERATE (*)     All other components within normal limits   MICROSCOPIC URINALYSIS - Abnormal; Notable for the following:     Bacteria, UA MANY (*)     Yeast, UA MODERATE (*)     All other components within normal limits   ARTERIAL BLOOD GAS, POC - Abnormal; Notable for the following:     POC pH 7.282 (*)     POC pCO2 54.0 (*)     POC PO2 129.2 (*)     All other components within normal limits   CREATININE W/GFR POINT OF CARE - Abnormal; Notable for the following:     POC Creatinine 7.72 (*)     GFR Comment 9 (*)     GFR Non- 7 (*)     All other components within normal limits   POCT GLUCOSE - Abnormal; Notable for the following:     POC Glucose 36 (*)     All other components within normal limits   POCT TROPONIN - Abnormal; Notable for the following:     POC Troponin I 0.25 (*)     All other components within normal limits   POCT TROPONIN - Abnormal; Notable for the following:     POC Troponin I 0.34 (*)     All other components within normal limits   CULTURE BLOOD #1   CULTURE BLOOD #1   URINE CULTURE   CHLORIDE (POC)   AMMONIA   APTT   HEPATIC FUNCTION PANEL   PROTIME-INR   POCT TROPONIN   LACTIC ACID,POINT OF CARE   ANION GAP (CALC) POC   POCT TROPONIN       Xr Chest Portable    Result Date: 3/17/2018  EXAMINATION: SINGLE VIEW OF THE CHEST 3/17/2018 1:36 pm COMPARISON: 2 hours prior HISTORY: ORDERING SYSTEM PROVIDED HISTORY: postintubation TECHNOLOGIST PROVIDED HISTORY: Reason for exam:->postintubation FINDINGS: There has been interval placement of an enteric tube with its distal extent not included on the provided image, however the side hole appears above the diaphragm. Advancement by 4-5 cm is suggested. Right-sided jugular venous catheter terminates overlying the expected location of SVC. No evidence for pneumothorax. Cardiopulmonary findings are otherwise unchanged. Enteric tube terminus is not included on the provided image, however the side hole appears above the diaphragm. Advancement of 4-5 cm is suggested. No evidence for pneumothorax. Xr Chest Portable    Result Date: 3/17/2018  EXAMINATION: SINGLE VIEW OF THE CHEST 3/17/2018 11:20 am COMPARISON: None. HISTORY: ORDERING SYSTEM PROVIDED HISTORY: stemi TECHNOLOGIST PROVIDED HISTORY: Reason for exam:->stemi Ordering Physician Provided Reason for Exam: stemi Acuity: Acute Type of Exam: Initial FINDINGS: Endotracheal tube terminates approximately 6.8 cm above the juanita. Cardiac silhouette is enlarged. Status post midline sternotomy. Dense atherosclerotic calcifications of the aortic arch. Suspected retrocardiac opacity. Osseous structures and soft tissues are grossly intact     Cardiomegaly. Suspect retrocardiac opacity. Endotracheal tube terminates 6.8 cm above the juanita. RECENT VITALS:     Temp: 101.7 °F (38.7 °C),  Pulse: 68, Resp: 16, BP: (!) 114/54, SpO2: 100 %    Bautista Moore is a 75 y.o. male who presents with complaint of cardiac arrest.  Return to spontaneous situation. Transferred from [address] [place] [address]. Recent stent placement. End-stage renal disease on dialysis. Also has fever of unclear etiology. Plan is admission to critical care versus withdrawal of care based on family decision    OUTSTANDING TASKS / RECOMMENDATIONS:    1.  Disposition made by previous attending and nothing to do      Violet Jennings MD  Attending Emergency Physician  [address] [facility]       Srinivasan De Paz

## 2018-03-17 NOTE — ED PROVIDER NOTES
Q-T Interval 430 ms    QTc Calculation (Bazett) 464 ms    P Axis 72 degrees    R Axis -79 degrees    T Axis 54 degrees       IMPRESSION: Patient is a 76-year-old gentleman who was brought in from [facility] with a complaint of post cardiac arrest.  On presentation to the emergency department the patient was intubated and not of any sedation but no observable movements. Per report he was on propofol for some point secondary to agitation but that has been off During transport. Pupils are 2 mm and sluggishly reactive. Auscultation of the lungs with bilateral breath sounds no wheeze or rales or rhonchi. Abdomen is mildly distended normal bowel sounds. The history of present illness and physical exam is concerning for cardiac versus pulmonary arrest.  Also considered pulmonary embolism,, acute respiratory failure secondary to pneumonia, sepsis. because of a history of recent cath I suspect that he may have had a cardiac arrest.  His initial EKG does not show an acute STEMI. I have discussed the patient's care with the interventional cardiologist on call Dr. Osker Boas and at this time his recommendation is admission to the medical ICU. We'll repeat his labs consult the medical ICU. RADIOLOGY:  Ct Abdomen Pelvis Wo Contrast    Result Date: 3/17/2018  EXAMINATION: CT OF THE ABDOMEN AND PELVIS WITHOUT CONTRAST 3/17/2018 3:33 pm TECHNIQUE: CT of the abdomen and pelvis was performed without the administration of intravenous contrast. Multiplanar reformatted images are provided for review. Dose modulation, iterative reconstruction, and/or weight based adjustment of the mA/kV was utilized to reduce the radiation dose to as low as reasonably achievable. COMPARISON: None. HISTORY: ORDERING SYSTEM PROVIDED HISTORY: abdominal distension Status post arrest. FINDINGS: Lower Chest: The heart is enlarged. Trace bilateral pleural effusion is seen with bibasilar consolidation, left greater than right.  Organs: Unremarkable unenhanced appearance of the liver, spleen, pancreas and left adrenal gland. On axial image 56, a 13 x 23 mm right adrenal gland nodule demonstrates central density 18 Hounsfield units in keeping with an adenoma. Bilateral renal atrophy is seen with bilateral renal cysts. Nonobstructing 2 mm calculus is seen at the midpole right kidney axial image 82. GI/Bowel: Unremarkable appearance of the unopacified bowel. No inflammatory changes evident. No obstruction is seen. The appendix is visualized right lower quadrant. Multifocal diverticula involve the descending and sigmoid colon without evidence of acute inflammatory change. Pelvis: Prostate gland and seminal vesicles appear unremarkable. Urinary bladder is decompressed by Mitchell catheter. No adenopathy or free fluid. Peritoneum/Retroperitoneum: Calcific atherosclerotic disease aorta. No aneurysm. Unremarkable appearance of the IVC. No adenopathy or fluid. Bones/Soft Tissues: Unremarkable appearance. No acute osseous abnormality evident. 1. Trace bilateral pleural effusion with left basilar consolidation greater on the right. 2. Cardiomegaly. 3.  Calcific atherosclerotic disease aorta. 4. Benign right adrenal adenoma. 5. Cholecystectomy. 6. Atrophic kidneys with bilateral renal cysts. Nonobstructing calculus midpole right kidney measures 2 mm. Ct Head Wo Contrast    Result Date: 3/17/2018  EXAMINATION: CT OF THE HEAD WITHOUT CONTRAST  3/17/2018 3:20 pm TECHNIQUE: CT of the head was performed without the administration of intravenous contrast. Dose modulation, iterative reconstruction, and/or weight based adjustment of the mA/kV was utilized to reduce the radiation dose to as low as reasonably achievable. COMPARISON: None. HISTORY: ORDERING SYSTEM PROVIDED HISTORY: AMS FINDINGS: BRAIN/VENTRICLES: There is no acute intracranial hemorrhage, mass effect or midline shift. No abnormal extra-axial fluid collection.   The gray-white differentiation is favoring atelectasis or scarring. Infectious process is possible in the proper clinical setting. Left 2nd through 6th and right 3rd/ 4th rib fractures, nondisplaced. Trace right intrapleural air, suggesting tiny pneumothorax. Advancement of the NG tube by 4-5 cm suggested. Xr Chest Portable    Result Date: 3/17/2018  EXAMINATION: SINGLE VIEW OF THE CHEST 3/17/2018 1:36 pm COMPARISON: 2 hours prior HISTORY: ORDERING SYSTEM PROVIDED HISTORY: postintubation TECHNOLOGIST PROVIDED HISTORY: Reason for exam:->postintubation FINDINGS: There has been interval placement of an enteric tube with its distal extent not included on the provided image, however the side hole appears above the diaphragm. Advancement by 4-5 cm is suggested. Right-sided jugular venous catheter terminates overlying the expected location of SVC. No evidence for pneumothorax. Cardiopulmonary findings are otherwise unchanged. Enteric tube terminus is not included on the provided image, however the side hole appears above the diaphragm. Advancement of 4-5 cm is suggested. No evidence for pneumothorax. Xr Chest Portable    Result Date: 3/17/2018  EXAMINATION: SINGLE VIEW OF THE CHEST 3/17/2018 11:20 am COMPARISON: None. HISTORY: ORDERING SYSTEM PROVIDED HISTORY: stemi TECHNOLOGIST PROVIDED HISTORY: Reason for exam:->stemi Ordering Physician Provided Reason for Exam: stemi Acuity: Acute Type of Exam: Initial FINDINGS: Endotracheal tube terminates approximately 6.8 cm above the juanita. Cardiac silhouette is enlarged. Status post midline sternotomy. Dense atherosclerotic calcifications of the aortic arch. Suspected retrocardiac opacity. Osseous structures and soft tissues are grossly intact     Cardiomegaly. Suspect retrocardiac opacity. Endotracheal tube terminates 6.8 cm above the juanita.      EKG  EKG Interpretation    Interpreted by emergency department physician    Rhythm: normal sinus  Rate: 70, normal  Axis: left  Ectopy: 20   WBC > 12 or < 4 or >10% bands  SIRS (>2) and confirmed or suspected source of infection = Sepsis    Sepsis Identified   Date: 3/17/18  Time: 3:30 Pm   Sepsis Orders:   CBC: Yes   CMP: Yes   PT/PTT: Yes   Blood Cultures x2: Yes   Urinalysis and Urine Culture: Yes   Lactate: Yes   Broad Spectrum Antibiotics Given (within 3 hours of sepsis identification, after blood cultures): Yes              IV Crystalloid given: Yes    If lactate >2.0 MUST repeat within 6 hours    Is lactate > 4.0:  No  If lactate >4.0 OR hypotension 30ml/kg crystalloid MUST be ordered. Fluids must be completed within 3 hours of sepsis identification. Patient's initial lactic acid was elevated at 7 and the one that I did hear what normal at 1.28. PROCEDURES:  None    CONSULTS:  IP CONSULT TO CARDIOLOGY  IP CONSULT TO CRITICAL CARE  PHARMACY TO DOSE VANCOMYCIN  IP CONSULT TO NEPHROLOGY  IP CONSULT TO INFECTIOUS DISEASES    CRITICAL CARE:  None    FINAL IMPRESSION      1. Cardiac arrest ([facility] [address]    2. Sepsis, due to unspecified organism ([facility] [address]    3. Urinary tract infection without hematuria, site unspecified          DISPOSITION / PLAN     DISPOSITION Admitted 03/17/2018 03:42:54 PM      PATIENT REFERRED TO:  Lori Acosta MD  [facility] [place]  [phone]            DISCHARGE MEDICATIONS:  New Prescriptions    No medications on file       Rubin Nguyen MD  Emergency Medicine Resident    (Please note that portions of this note were completed with a voice recognition program.  Efforts were made to edit the dictations but occasionally words are mis-transcribed.      Rubin Nguyen MD  Resident  03/17/18 1872

## 2018-03-17 NOTE — PLAN OF CARE
Problem: MECHANICAL VENTILATION  Goal: Patient will maintain patent airway  Outcome: Ongoing    Goal: Oral health is maintained or improved  Outcome: Ongoing    Goal: ET tube will be managed safely  Outcome: Ongoing      Problem: OXYGENATION/RESPIRATORY FUNCTION  Goal: Patient will maintain patent airway  Outcome: Ongoing    Goal: Patient will achieve/maintain normal respiratory rate/effort  Respiratory rate and effort will be within normal limits for the patient  Outcome: Ongoing      Problem: SKIN INTEGRITY  Goal: Skin integrity is maintained or improved  Outcome: Ongoing

## 2018-03-17 NOTE — PLAN OF CARE
Problem: MECHANICAL VENTILATION  Goal: Patient will maintain patent airway  Outcome: Ongoing    Goal: Oral health is maintained or improved  Outcome: Ongoing    Goal: ET tube will be managed safely  Outcome: Ongoing

## 2018-03-17 NOTE — ED PROVIDER NOTES
Critical Care - History and Physical Examination    Patient's name:  Lyla Boland  Medical Record Number: [id number]  Patient's account/billing number: [de-identified]  Patient's YOB: 1950  Age: 75 y.o. Date of Admission: 3/17/2018 12:55 PM  Reason of ICU admission:   Date of History and Physical Examination: 3/17/2018      Primary Care Physician: Isma Xie MD  Attending Physician:    Code Status: Full Code    Chief complaint:     Reason for ICU admission:   Status post cardiac arrest, respiratory spontaneous circulation, endotracheal intubation    History Of Present Illness:   History was obtained from spouse and chart review. Lyla Boland is a 75 y.o. which being admitted status post cardiac arrest. The patient's wife is in the room with the patient states that this morning around 9 AM went to check on the patient is a result of her sleep as late and states that the patient was breathing very slowly and shallow and they could not wake him up. They called EMS who found the patient to be in asystole and started CPR. The uterus report states that's. The patient one dose of epinephrine and had return of spontaneous circulation after 11 minutes. The patient was transferred from [address] [place], [place] [address] emergency department. Patient's wife states that the patient has a history of end-stage renal disease requiring dialysis and diabetes. The patient's wife states that the patient has been acting abnormally since yesterday. She states that the patient did not complete his dialysis treatment yesterday. The patient does have a living will that states that he would like to be DNR CC in the event that he is not able to make his own decisions and has been diagnosed with a terminal illness. The patient had a cardiac cath with a bare metal stent placement 5 days ago at the hospital which was performed after an abnormal stress test which was obtained for medical clearance for cervical fusion surgery.  Patient GLUCOSE  152*   --   46*     S. Calcium:  Recent Labs      03/17/18   1310   CALCIUM  8.2*     S. Ionized Calcium:No results for input(s): IONCA in the last 72 hours. S. Magnesium:  Recent Labs      03/17/18   1137   MG  2.1     S. Phosphorus:No results for input(s): PHOS in the last 72 hours. S. Glucose:  Recent Labs      03/17/18   1122  03/17/18   1308   POCGLU  154*  36*     Glycosylated hemoglobin A1C: No results for input(s): LABA1C in the last 72 hours. INR: No results for input(s): INR in the last 72 hours. Hepatic functions:   Recent Labs      03/17/18   1137   ALKPHOS  96   ALT  74*   AST  72*   PROT  5.8*   BILITOT  0.42   BILIDIR  0.18   LABALBU  3.2*     Pancreatic functions:  Recent Labs      03/17/18   1137   LACTA  5.4*     S. Lactic Acid:   Recent Labs      03/17/18   1137   LACTA  5.4*     Cardiac enzymes:  Recent Labs      03/17/18   1137  03/17/18   1307  03/17/18   1504   CKTOTAL  221   --    --    CKMB  9.0   --    --    TROPONINI   --   0.25*  0.34*     BNP:No results for input(s): BNP in the last 72 hours. Lipid profile: No results for input(s): CHOL, TRIG, HDL, LDLCALC in the last 72 hours.     Invalid input(s): LDL  Blood Gases: No results found for: PH, PCO2, PO2, HCO3, O2SAT  Thyroid functions: No results found for: TSH     Urinalysis:   Urinalysis showed positive nitrite and moderate leukocyte esterase    Microbiology:  Cultures during this admission:     Blood and urine cultures are pending    Blood cultures:                 [] None drawn      [] Negative             []  Positive (Details:  )  Urine Culture:                   [] None drawn      [] Negative             []  Positive (Details:  )  Sputum Culture:               [] None drawn       [] Negative             []  Positive (Details:  )   Endotracheal aspirate:     [] None drawn       [] Negative             []  Positive (Details:  )         -----------------------------------------------------------------  Radiological

## 2018-03-17 NOTE — ED PROVIDER NOTES
arrest    Plan: Chest x-ray, EKG, troponin, , CBC, BMP cardiology consultation      EKG Interpretation    Interpreted by me  Sinus rhythm with a first-degree AV block and a ventricular rate of 70, right bundle branch block, left axis deviation, prolonged QT corrected, minimal voltage criteria for left ventricular hypertrophy      CRITICAL CARE: There was a high probability of clinically significant/life threatening deterioration in this patient's condition which required my urgent intervention. Total critical care time was 30 minutes. This excludes any time for separately reportable procedures. (Please note that portions of this note were completed with a voice recognition program.  Efforts were made to edit the dictations but occasionally words are mis-transcribed.)    Guille Benavides.  Carrie Ash MD, [facility]  Attending Emergency Medicine Physician        Gladys Olivares MD  03/17/18 2570

## 2018-03-17 NOTE — ED NOTES
Kathie Gomez RN called to give report.  Dr. Darrick Fernandez requested for the pt to come straight up and bedside report be given     Laila Breaux RN  03/17/18 1468

## 2018-03-18 NOTE — PROGRESS NOTES
Nutrition Assessment    Type and Reason for Visit: Initial    Nutrition Recommendations: If TF desired, suggest Vital High Protein (low calorie high protein) at goal of 65 mL/hr (1560 kcals, 137 gm protein). Malnutrition Assessment:  · Malnutrition Status: Insufficient data    Nutrition Diagnosis:   · Problem: Inadequate oral intake  · Etiology: related to Impaired respiratory function-inability to consume food     Signs and symptoms:  as evidenced by NPO status due to medical condition    Nutrition Assessment:  · Subjective Assessment: Pt currently intubated. · Nutrition-Focused Physical Findings: +2 pitting edema BLE noted  · Wound Type: None (Redness/abrasions noted)  · Current Nutrition Therapies:  · Oral Diet Orders: NPO   · Additional Calories: Propofol at 3.5 mL/hr = 92 kcals/day  · Anthropometric Measures:  · Ht: 5' 11\" (180.3 cm)   · Admission Body Wt: 259 lb 14.8 oz (117.9 kg)  · Ideal Body Wt: 172 lb (78 kg), % Ideal Body 151%  · BMI Classification: BMI 35.0 - 39.9 Obese Class II  · Comparative Standards (Estimated Nutrition Needs):  · Estimated Daily Total Kcal: 2337-3413 kcals/day  · Estimated Daily Protein (g): 155-170 g/day    Estimated Intake vs Estimated Needs: Intake Less Than Needs    Nutrition Risk Level: High    Nutrition Interventions:   Continue NPO  Continued Inpatient Monitoring, Education Not Indicated    Nutrition Evaluation:   · Evaluation: Goals set   · Goals: Meet % of estimated nutrient needs. · Monitoring: NPO Status, Fluid Balance, Ascites/Edema, Weight, Comparative Standards, Pertinent Labs    See Adult Nutrition Doc Flowsheet for more detail.      Electronically signed by Liz Lanier MS, RD, LD on 3/18/18 at 11:13 AM    Contact Number: [phone]

## 2018-03-18 NOTE — CONSULTS
REPORTED 03/17/2018    RBC 2.92 03/18/2018    TRICHOMONAS NOT REPORTED 03/17/2018    WBC 16.4 03/18/2018    YEAST MODERATE 03/17/2018    TURBIDITY TURBID 03/17/2018     Lab Results   Component Value Date    CREATININE 7.97 03/18/2018    GLUCOSE 117 03/18/2018       Detailed results: Thank you for allowing us to participate in the care of this patient. Please call with questions.     Mechelle Springer MD  Office: [phone]

## 2018-03-18 NOTE — H&P
Attending Physician Statement  I have discussed the case of Gideon Ruby, including pertinent history and exam findings with the resident/fellow/NP/PA. I have seen and examined the patient and the key elements of the encounter have been performed by me. I agree with the assessment, plan and orders as documented by the resident/fellow/NP/PA  With changes made to the note as needed. Pt was seen during rounds. Review of Systems:   In addition to the pertinent positives and negatives as stated within HPI and the review of systems as documented in their notes, all other systems were reviewed when able to and are reported negative. Patient admitted status post cardiac arrest.  Patient had 11 minutes of CPR for asystole  Acute respiratory failure with hypoxemia and hypercapnia. Hypoglycemia could be related to usage of sulfonylureas and poor oral intake in a Patient with end-stage renal disease. This could be the reason for the patient's encephalopathy. Shock due to sepsis due to UTI and also possibly cardiogenic in origin  End-stage renal disease on hemodialysis. Type 2 diabetes mellitus. Recent bare metal stent placement for coronary artery disease. Cervical spine disease and patient is being prepped for cervical fusion surgery. Allergy to penicillin. Anemia due to end-stage renal disease. Leukocytosis. Hepatitis secondary to shock from recent CPR. Troponin increase.?  MI, which could have led to his cardiac arrest.  High NG tube. UTI  Bilateral pleural effusions with possible atelectasis versus pneumonia. Bilateral rib fractures secondary to CPR. History of corticosteroid use    Continue mechanical ventilatory support per protocol. Hypothermia protocol. Broad-spectrum antibiotics to cover for UTI and pneumonia. Obtain echocardiogram.  Cardiology consultation in view of the recent stent placement and  the troponin increase. Follow liver enzymes  Monitor and control blood sugars.   Nephrology
reports:    CXR:  3/17/18   Enteric tube terminus is not included on the provided image, however the side   hole appears above the diaphragm.  Advancement of 4-5 cm is suggested.       No evidence for pneumothorax.             Electrocardiogram:     Last Echocardiogram findings:   3/9/18   Moderate concentric hypertrophy. · The estimated left ventricular ejection fraction is 60 - 65%. · No pericardial effusion.          Assessment and Plan     Patient Active Problem List   Diagnosis    Cardiac arrest ([facility] [address]    End stage renal disease ([facility] [address]    Acute pyelonephritis         Additional assessment:  · Status post cardiac arrest with return of spontaneous circulation  · Respiratory arrest, intubated on ventilator  · UTI, Possible urosepsis  · Positive yeast in UA  · Febrile  · End-stage renal disease  · Elevated troponin      Plan:  · Admit to MICU  · Place Quattro central line and start hypothermia post arrest protocol  · Levophed for hypotension  · Versed for sedation  · Fentanyl for pain control PRN  · Continue Rocephin and Vancomycin  · F/U CT chest and abd  · Monitor labs, I/O  · Nephrology recs  · Cardiology recs  · Phoebe Henriquez, DO  Emergency Medicine PGY-3  Critical Care Service  3/17/2018, 4:51 PM

## 2018-03-18 NOTE — PROGRESS NOTES
INTENSIVE CARE UNIT  Resident Physician Progress Note    Patient - Will Chris  Date of Admission -  3/17/2018 12:55 PM  Date of Evaluation -  3/18/2018  Room and Bed Number -  0121/0121-01   Hospital Day - 1      SUBJECTIVE:     OVERNIGHT EVENTS:    Patient with intermittent hypoglycemic episodes treated with D50 and subsequently D10 drip. Started on hypothermia protocol. Off levophed    TODAY:    Paralyzed and on hypothermia protocol this AM. Pupils sluggishly reactive. Did have cough/gag weak prior to initiation of paralytic.       AWAKE & FOLLOWING COMMANDS:  [x] No   [] Yes    SECRETIONS Amount:  [] Small [] Moderate  [] Large  [x] None  Color:     [] White [] Colored  [] Bloody    SEDATION:  RAAS Score:  [x] Propofol gtt  [] Versed gtt  [] Ativan gtt   [] No Sedation    PARALYZED:  [] No    [x] Yes    VASOPRESSORS:  [x] No    [] Yes  [] Levophed [] Dopamine [] Vasopressin  [] Dobutamine [] Phenylephrine [] Epinephrine      OBJECTIVE:     VITAL SIGNS:  BP (!) 183/79   Pulse 57   Temp 95.5 °F (35.3 °C) (Core)   Resp 16   Ht 5' 11\" (1.803 m)   Wt 260 lb (117.9 kg)   SpO2 100%   BMI 36.26 kg/m²   Tmax over 24 hours:  Temp (24hrs), Av.6 °F (37.6 °C), Min:95.5 °F (35.3 °C), Max:101.7 °F (38.7 °C)      Patient Vitals for the past 8 hrs:   BP Temp Temp src Pulse Resp SpO2   18 0600 (!) 183/79 - - 57 16 -   18 0500 (!) 159/37 - - (!) 49 13 -   18 0400 (!) 163/42 - - (!) 46 18 -   18 0328 - - - (!) 46 18 100 %   18 0300 (!) 158/30 - - (!) 46 18 100 %   18 0200 (!) 148/31 - - (!) 46 17 100 %   18 0100 (!) 153/26 - - (!) 48 18 100 %   18 0030 - - - 52 18 -   18 0015 - - - 55 18 -   18 0000 (!) 143/37 95.5 °F (35.3 °C) CORE 51 18 100 %   18 2351 - - - 50 18 100 %   18 2345 - - - 52 18 -   18 2330 - - - 52 18 -   18 2315 - - - 60 18 -   18 2300 (!) 160/33 - - 64 18 100 %   18 2245 - - - 71 16 -         Intake/Output PRN Meds:     acetaminophen 650 mg Q4H PRN   sodium chloride flush 10 mL PRN   acetaminophen 650 mg Q4H PRN   ondansetron 4 mg Q6H PRN   potassium chloride 10 mEq PRN   potassium chloride 20 mEq PRN   magnesium sulfate 1 g PRN   sodium phosphate IVPB 0.16 mmol/kg PRN   Or     sodium phosphate IVPB 0.32 mmol/kg PRN   glucose 15 g PRN   dextrose 12.5 g PRN   glucagon (rDNA) 1 mg PRN   dextrose 100 mL/hr PRN   midazolam 2 mg Q30 Min PRN   glucose 15 g PRN   dextrose 12.5 g PRN   glucagon (rDNA) 1 mg PRN   dextrose 100 mL/hr PRN       SUPPORT DEVICES: [x] Ventilator [] BIPAP  [] Nasal Cannula [] Room Air    VENT SETTINGS (Comprehensive) (if applicable):  Vent Information  Vent Type: Servo i  Vent Mode: PRVC  Vt Ordered: 600 mL  Rate Set: (S) 13 bmp  FiO2 : 30 %  Sensitivity: 5  PEEP/CPAP: 5  I Time/ I Time %: 0.75 s  Additional Respiratory  Assessments  Pulse: 57  Resp: 16  SpO2: 100 %  End Tidal CO2: 26 (%)  Position: Semi-Cordon's  Oral Care Completed?: Yes  Oral Care: Mouth suctioned, Mouth swabbed, Mouth moisturizer  Subglottic Suction Done?: Yes    ABGs:   Lab Results   Component Value Date    HSJ4WXK 23 03/18/2018    FIO2 30.0 03/18/2018         DATA:  Complete Blood Count: Recent Labs      03/17/18   1137  03/17/18   1310  03/18/18   0410   WBC  18.7*  25.8*  16.4*   RBC  3.38*  3.09*  2.92*   HGB  11.0*  9.9*  9.4*   HCT  34.1*  32.6*  29.5*   MCV  100.8*  105.5*  101.0   MCH  32.5  32.0  32.2   MCHC  32.2  30.4  31.9   RDW  15.5*  13.8  13.8   PLT  260  231  166   MPV  8.8  10.7  10.6        Last 3 Blood Glucose:   Recent Labs      03/17/18   1137  03/17/18   1310  03/17/18 2028 03/17/18 2238  03/18/18   0409   GLUCOSE  152*  46*  4*  76  117*        PT/INR:    Lab Results   Component Value Date    PROTIME 10.1 03/17/2018    INR 0.9 03/17/2018     PTT:    Lab Results   Component Value Date    APTT 23.2 03/17/2018       Comprehensive Metabolic Profile:   Recent Labs      03/17/18   1137   03/17/18 pyelonephritis 03/17/2018          PLAN:     WEAN PER PROTOCOL:  [x] No   [] Yes  [] N/A    ICU PROPHYLAXIS:  Stress ulcer:  [] PPI Agent  [x] S6Otmzh [] Sucralfate  [] Other:  VTE:   [] Enoxaparin  [x] Unfract. Heparin Subcut  [] EPC Cuffs    NUTRITION:  [x] NPO [] Tube Feeding (Specify: ) [] TPN  [] PO    HOME MEDS RECONCILED: [x] No  [] Yes    CONSULTATION NEEDED:  [x] No  [] Yes    FAMILY UPDATED:    [x] No  [] Yes    TRANSFER OUT OF ICU:   [x] No  [] Yes        Additional Assessment:  Active Problems:    Cardiac arrest ([facility] [address]    End stage renal disease ([facility] [address]    Acute pyelonephritis  Resolved Problems:    * No resolved hospital problems.  *       Plan:  · Continue hypothermia protocol  · Levophed as needed for hypotension  · Propofol sedation  · Vent management per protocol  · Awaiting ID recs, continue rocephin and vancomycin  · F/u cardiology recs  · F/u nephro recs            Melford Gosselin, MD  Emergency Medicine PGY-2  Critical Care Resident  3/18/2018 6:42 AM

## 2018-03-18 NOTE — CONSULTS
mcg/kg/min (03/18/18 0915)    midazolam Stopped (03/17/18 2122)    propofol 5 mcg/kg/min (03/18/18 0816)    sodium chloride 10 mL/hr at 03/17/18 2114    dextrose       PRN Meds:  morphine **OR** morphine, acetaminophen, sodium chloride flush, acetaminophen, ondansetron, potassium chloride, potassium chloride, magnesium sulfate, sodium phosphate IVPB **OR** sodium phosphate IVPB, glucose, dextrose, glucagon (rDNA), dextrose, midazolam, glucose, dextrose, glucagon (rDNA), dextrose    ALLERGY     Penicillin v potassium    SOCIAL HISTORY     Social History     Social History    Marital status:      Spouse name: N/A    Number of children: N/A    Years of education: N/A     Occupational History    Not on file. Social History Main Topics    Smoking status: Not on file    Smokeless tobacco: Not on file    Alcohol use Not on file    Drug use: Unknown    Sexual activity: Not on file     Other Topics Concern    Not on file     Social History Narrative    No narrative on file       FAMILY HISTORY      History reviewed. No pertinent family history. REVIEW OF SYSTEM      Unable to obtain as intubated and mechanically ventilated    EXAMINATION       Vitals:    03/18/18 0851 03/18/18 0900 03/18/18 1000 03/18/18 1009   BP:  (!) 190/53 (!) 170/42    Pulse: 50 51 (!) 48 (!) 47   Resp: 11 13 13 13   Temp:       TempSrc:       SpO2: 100%  100% 100%   Weight:       Height:         24HR INTAKE/OUTPUT:    Intake/Output Summary (Last 24 hours) at 03/18/18 1059  Last data filed at 03/18/18 0800   Gross per 24 hour   Intake             1728 ml   Output             1861 ml   Net             -133 ml       General appearance:MV  Neck: No JVD, Lymphadenopathty or thyromegaly  Respiratory: vesicular breath sounds,no wheeze/crackles  Cardiovascular: S1 S2 normal,no gallop or organic murmur. No carotid bruit  Abdomen:Non tender/non distended. Bowel sounds present  Extremities: No Cyanosis or Clubbing,PRESENT Lower extremity edema  Neurological:MV    INVESTIGATIONS     PTH:  No results found for: PTH  abs:   CBC: Recent Labs      03/17/18   1137  03/17/18   1310  03/18/18   0410   WBC  18.7*  25.8*  16.4*   RBC  3.38*  3.09*  2.92*   HGB  11.0*  9.9*  9.4*   HCT  34.1*  32.6*  29.5*   MCV  100.8*  105.5*  101.0   MCH  32.5  32.0  32.2   MCHC  32.2  30.4  31.9   RDW  15.5*  13.8  13.8   PLT  260  231  166   MPV  8.8  10.7  10.6      BMP: Recent Labs      03/17/18 2028 03/17/18 2238 03/18/18   0409   NA  137  137  132*   K  4.1  3.9  3.7   CL  98  96*  93*   CO2  23  23  21   BUN  40*  41*  44*   CREATININE  7.39*  8.35*  7.97*   GLUCOSE  4*  76  117*   CALCIUM  8.6  8.2*  8.2*        Phosphorus:    Recent Labs      03/17/18 2028 03/17/18 2238 03/18/18   0409   PHOS  3.5  5.1*  4.2     Magnesium:   Recent Labs      03/17/18 2028 03/17/18 2238 03/18/18   0409   MG  1.9  2.0  2.0     Albumin:   Recent Labs      03/17/18 1137 03/17/18 2028   LABALBU  3.2*  3.4*       ASSESSMENT   Admitted the following episode of cardiac arrest at home preceded by history of cardiac stent placement 4 days back. CPR done for 11 minutes. Currently on therapeutic hypothermia protocol. Also noted to have UTI. 1.  End-stage renal disease secondary to biopsy-proven diabetic nephrosclerosis on maintenance dialysis Monday Wednesday Friday via left AV graft. Dr Isidore Runner  2. VDRF  3. UTI  4. S/p post cardiac arrest on therapeutic hypothermia protocol   5. History of CABG  6. Type 2 diabetes  7. Essential hypertension  8. Anemia of chronic disease  9. Secondary hyperparathyroidism    PLAN     1. Continue current therapeutic hypothermia protocol. Rewarming to be started from tonight. 2.  Hemodialysis tomorrow  3. Will follow      Thank you for the consultation. Please do not hesitate to call with questions.     This note is created with the assistance of a speech-recognition program. While intending to generate a document that actually reflects the content of the visit, no guarantees can be provided that every mistake has been identified and corrected by editing      Goran Bee MD, MRCP Susan Rivera, FACP   3/18/2018 10:59 AM  [facility]

## 2018-03-18 NOTE — CARE COORDINATION
At this time family is at bedside , but after speaking to the RN, it is not an appropriate time to assess.

## 2018-03-19 PROBLEM — N30.80 PYOCYSTIS: Status: ACTIVE | Noted: 2018-01-01

## 2018-03-19 NOTE — PLAN OF CARE
Problem: MECHANICAL VENTILATION  Goal: Patient will maintain patent airway  Outcome: Met This Shift    Goal: Oral health is maintained or improved  Outcome: Met This Shift    Goal: ET tube will be managed safely  Outcome: Met This Shift      Problem: OXYGENATION/RESPIRATORY FUNCTION  Goal: Patient will achieve/maintain normal respiratory rate/effort  Respiratory rate and effort will be within normal limits for the patient   Outcome: Met This Shift    Goal: Patient will maintain patent airway  Outcome: Met This Shift      Problem: SKIN INTEGRITY  Goal: Skin integrity is maintained or improved  Outcome: Met This Shift      Problem: Cardiac Output - Decreased:  Goal: Hemodynamic stability will improve  Hemodynamic stability will improve   Outcome: Met This Shift      Problem: Mental Status - Impaired:  Goal: Mental status will be restored to baseline  Mental status will be restored to baseline   Outcome: Ongoing      Problem: Pain:  Goal: Pain level will decrease  Pain level will decrease   Outcome: Met This Shift    Goal: Recognizes and communicates pain  Recognizes and communicates pain   Outcome: Ongoing    Goal: Control of acute pain  Control of acute pain   Outcome: Met This Shift    Goal: Control of chronic pain  Control of chronic pain   Outcome: Met This Shift      Problem: Serum Glucose Level - Abnormal:  Goal: Ability to maintain appropriate glucose levels will improve to within specified parameters  Ability to maintain appropriate glucose levels will improve to within specified parameters   Outcome: Ongoing      Problem: Skin Integrity - Impaired:  Goal: Will show no infection signs and symptoms  Will show no infection signs and symptoms   Outcome: Ongoing    Goal: Absence of new skin breakdown  Absence of new skin breakdown   Outcome: Met This Shift      Problem: Tissue Perfusion, Altered:  Goal: Circulatory function within specified parameters  Circulatory function within specified parameters   Outcome: Ongoing      Problem: Nutrition  Goal: Optimal nutrition therapy  Outcome: Ongoing      Problem: Risk for Impaired Skin Integrity  Goal: Tissue integrity - skin and mucous membranes  Structural intactness and normal physiological function of skin and  mucous membranes.    Outcome: Met This Shift      Problem: Falls - Risk of:  Goal: Will remain free from falls  Will remain free from falls   Outcome: Met This Shift    Goal: Absence of physical injury  Absence of physical injury   Outcome: Met This Shift

## 2018-03-19 NOTE — PROGRESS NOTES
INTENSIVE CARE UNIT  Resident Physician Progress Note    Patient - Lexis Fast  Date of Admission -  3/17/2018 12:55 PM  Date of Evaluation -  3/19/2018  Room and Bed Number -  0121/0121-01   Hospital Day - 2      SUBJECTIVE:     OVERNIGHT EVENTS:   Pt intubated, off sedation this am, was on propofol. Some movement in lower extr, but otherwise not arousable. Has + corneal reflex and pupils reactive. Plan to have dialysis today. Rewarming started overnight. Blood glucose between 117-200, on D5, 0.5 NS running at 75 cc/hr and hydrocortisone 100 mg tid started last night. Off pressor support. Having bladder irrigated TID. Urine cx grew Candida.       AWAKE & FOLLOWING COMMANDS:  [x] No   [] Yes    SECRETIONS Amount:  [] Small [] Moderate  [] Large  [x] None  Color:     [] White [] Colored  [] Bloody    SEDATION:  RAAS Score:  [x] Propofol gtt  [] Versed gtt  [] Ativan gtt   [] No Sedation    PARALYZED:  [x] No    [] Yes    VASOPRESSORS:  [x] No    [] Yes  [] Levophed [] Dopamine [] Vasopressin  [] Dobutamine [] Phenylephrine [] Epinephrine      OBJECTIVE:     VITAL SIGNS:  BP (!) 126/51   Pulse 78   Temp 96.1 °F (35.6 °C) (Core)   Resp 15   Ht 5' 11\" (1.803 m)   Wt 259 lb 4.2 oz (117.6 kg)   SpO2 100%   BMI 36.16 kg/m²   Tmax over 24 hours:  Temp (24hrs), Av.1 °F (34.5 °C), Min:93.2 °F (34 °C), Max:96.1 °F (35.6 °C)      Patient Vitals for the past 8 hrs:   BP Temp Temp src Pulse Resp SpO2 Weight   18 0850 - - - 78 15 100 % -   18 0820 - - - 72 15 100 % -   18 0645 - - - 57 15 100 % -   18 0630 - - - 57 15 100 % 259 lb 4.2 oz (117.6 kg)   18 0615 - - - 59 15 100 % -   18 0600 (!) 126/51 - - 62 15 100 % -   18 0545 - - - 63 15 100 % -   18 0530 - - - 66 15 100 % -   18 0515 - - - 69 15 100 % -   18 0500 (!) 171/79 - - 66 15 99 % -   18 0445 - - - 63 13 100 % -   18 0430 - - - 63 14 - -   18 0415 - - - 62 13 - -   18 0400 (!) 132/46 96.1 °F (35.6 °C) CORE 63 13 - -   03/19/18 0345 - - - 64 13 - -   03/19/18 0330 - - - 66 13 - -   03/19/18 0326 - - - 66 15 100 % -   03/19/18 0315 - - - 60 13 - -   03/19/18 0300 129/63 - - 59 13 - -   03/19/18 0245 - - - 60 13 100 % -   03/19/18 0230 - - - 65 13 100 % -   03/19/18 0215 - - - 55 13 100 % -   03/19/18 0200 (!) 143/35 - - 56 13 100 % -   03/19/18 0145 - - - 59 14 100 % -   03/19/18 0130 - - - 59 13 100 % -   03/19/18 0115 - - - 55 13 100 % -         Intake/Output Summary (Last 24 hours) at 03/19/18 0903  Last data filed at 03/19/18 0630   Gross per 24 hour   Intake             2309 ml   Output              624 ml   Net             1685 ml       Date 03/19/18 0000 - 03/19/18 2359   Shift 4675-2561 7921-4443 4164-1185 24 Hour Total   I  N  T  A  K  E   I.V.  (mL/kg) 947  (8.1)   947  (8.1)    Shift Total  (mL/kg) 947  (8.1)   947  (8.1)   O  U  T  P  U  T   Urine  (mL/kg/hr) 98  (0.1)   98    Emesis/NG output  (mL/kg) 250  (2.1)   250  (2.1)    Shift Total  (mL/kg) 348  (3)   348  (3)   Weight (kg) 117.6 117.6 117.6 117.6     Wt Readings from Last 3 Encounters:   03/19/18 259 lb 4.2 oz (117.6 kg)   03/17/18 242 lb 8.1 oz (110 kg)     Body mass index is 36.16 kg/m².         PHYSICAL EXAM:  GEN:  Intubated, sedated overnight but off this am  EYES:   3mm slugglishly reactive bilaterally  LUNGS:  CTAB, equal rise and fall of chest  CV:    RRR, no murmurs  ABDOMEN:   Soft, non-distended  :    Mild amt of bleeding per ureteral meatus, bruising over suprapubic region  NEURO[de-identified]   Intubated, off sedation at this moment, no movement during my exam  EXTREMITIES:  No pedal or leg edema, no calf swelling, no erythema, distal pulses intact       MEDICATIONS:  Scheduled Meds:   aspirin  81 mg Oral Daily    clopidogrel  75 mg Oral Daily    acyclovir  10 mg/kg (Ideal) Intravenous Q24H    famotidine  20 mg Oral Daily    hydrocortisone sodium succinate PF  100 mg Intravenous Q8H    sodium chloride --    --    --     < > = values in this interval not displayed. Magnesium:   Lab Results   Component Value Date    MG 1.9 03/18/2018    MG 2.0 03/18/2018    MG 2.0 03/18/2018     Phosphorus:   Lab Results   Component Value Date    PHOS 5.5 03/18/2018    PHOS 5.2 03/18/2018    PHOS 4.2 03/18/2018     Ionized Calcium:   Lab Results   Component Value Date    CAION 1.22 03/18/2018    CAION 1.08 03/18/2018    CAION 1.17 03/18/2018        Urinalysis:   Lab Results   Component Value Date    NITRU POSITIVE 03/17/2018    COLORU BROWN 03/17/2018    PHUR 6.5 03/17/2018    WBCUA TOO NUMEROUS TO COUNT 03/17/2018    RBCUA 20 TO 50 03/17/2018    MUCUS NOT REPORTED 03/17/2018    TRICHOMONAS NOT REPORTED 03/17/2018    YEAST MODERATE 03/17/2018    BACTERIA MANY 03/17/2018    SPECGRAV 1.025 03/17/2018    LEUKOCYTESUR MODERATE 03/17/2018    UROBILINOGEN Normal 03/17/2018    BILIRUBINUR NEGATIVE  Verified by ictotest. 03/17/2018    GLUCOSEU 1+ 03/17/2018    KETUA TRACE 03/17/2018    AMORPHOUS NOT REPORTED 03/17/2018       HgBA1c:  No results found for: LABA1C  TSH:  No results found for: TSH  Lactic Acid:   Lab Results   Component Value Date    LACTA 5.4 03/17/2018      Troponin:   Recent Labs      03/17/18   1307  03/17/18   1504   TROPONINI  0.25*  0.34*     ASSESSMENT:     Patient Active Problem List    Diagnosis Date Noted    Cardiac arrest ([facility] [address] 03/17/2018    End stage renal disease ([facility] [address] 03/17/2018    Pyocystis 03/17/2018          PLAN:     WEAN PER PROTOCOL:  [x] No   [] Yes  [] N/A    ICU PROPHYLAXIS:  Stress ulcer:  [] PPI Agent  [x] [id number] [] Sucralfate  [] Other:  VTE:   [] Enoxaparin  [x] Unfract.  Heparin Subcut  [] EPC Cuffs    NUTRITION:  [x] NPO [] Tube Feeding (Specify: ) [] TPN  [] PO    HOME MEDS RECONCILED: [x] No  [] Yes    CONSULTATION NEEDED:  [x] No  [] Yes    FAMILY UPDATED:    [] No  [x] Yes    TRANSFER OUT OF ICU:   [x] No  [] Yes        Additional Assessment:  Active Problems:    Cardiac arrest

## 2018-03-19 NOTE — CARE COORDINATION
Case Management Initial Discharge Plan  [place] Check,         Readmission Risk              Readmission Risk:        1       Age 72 or Greater:  1    Admitted from SNF or Requires Paid or Family Care:      Currently has CHF,COPD,ARF,CRI,or is on dialysis:      Takes more than 5 Prescription Medications:      Takes Digoxin,Insulin,Anticoagulants,Narcotics or ASA/Plavix:      Hospital Admit in Past 12 Months:      On Disability:      Patient Considers own Health:              Met with:spouse/SO to discuss discharge plans. Information verified: address, contacts, phone number, , insurance Yes  PCP: Fadumo Cedeno MD  Date of last visit: last thursday    Insurance Provider: [facility] Medicare    Discharge Planning  Current Residence:  Private Residence  Living Arrangements:  Spouse/Significant Other   Home has  stories/ stairs to climb  Support Systems:  Spouse/Significant Other, Family Members  Current Services PTA:    Supplier:   Patient able to perform ADL's:Assisted  DME used to aid ambulation prior to admission: /during admission    Potential Assistance Needed:  [address]  [facility] [address]  Potential Assistance Purchasing Medications:  No  Does patient want to participate in local refill/ meds to beds program?  No    Patient agreeable to home care: unknown dc needs  Freedom of choice provided:  n/a      Type of Home Care Services:  None  Patient expects to be discharged to:  likely SNF/LTACH    Prior SNF/Rehab Placement and Facility: no  Agreeable to SNF/Rehab: unknown  [place] of choice provided: yes   Evaluation: n/a    Expected Discharge date:  18  Follow Up Appointment: Best Day/ Time:      Transportation provider: family  Transportation arrangements needed for discharge: No    Discharge Plan: Pt intubated s/p cardiac arrest.  Spoke with pt's wife. Verified information.   Pt critical at this time, did not discuss dc planning at this

## 2018-03-19 NOTE — PLAN OF CARE
Problem: MECHANICAL VENTILATION  Goal: Patient will maintain patent airway  Outcome: Ongoing    Goal: Oral health is maintained or improved  Outcome: Ongoing    Goal: ET tube will be managed safely  Outcome: Ongoing      Problem: OXYGENATION/RESPIRATORY FUNCTION  Goal: Patient will achieve/maintain normal respiratory rate/effort  Respiratory rate and effort will be within normal limits for the patient   Outcome: Ongoing    Goal: Patient will maintain patent airway  Outcome: Ongoing      Problem: Cardiac Output - Decreased:  Goal: Hemodynamic stability will improve  Hemodynamic stability will improve   Outcome: Ongoing      Problem: Mental Status - Impaired:  Goal: Mental status will be restored to baseline  Mental status will be restored to baseline   Outcome: Ongoing      Problem: Pain:  Goal: Pain level will decrease  Pain level will decrease   Outcome: Ongoing    Goal: Recognizes and communicates pain  Recognizes and communicates pain   Outcome: Ongoing    Goal: Control of acute pain  Control of acute pain   Outcome: Ongoing      Problem: Skin Integrity - Impaired:  Goal: Will show no infection signs and symptoms  Will show no infection signs and symptoms   Outcome: Ongoing    Goal: Absence of new skin breakdown  Absence of new skin breakdown   Outcome: Ongoing      Problem: Tissue Perfusion, Altered:  Goal: Circulatory function within specified parameters  Circulatory function within specified parameters   Outcome: Ongoing      Problem: Risk for Impaired Skin Integrity  Goal: Tissue integrity - skin and mucous membranes  Structural intactness and normal physiological function of skin and  mucous membranes.    Outcome: Ongoing

## 2018-03-19 NOTE — PLAN OF CARE
Problem: MECHANICAL VENTILATION  Goal: Patient will maintain patent airway  Outcome: Ongoing    Goal: Oral health is maintained or improved  Outcome: Ongoing    Goal: ET tube will be managed safely  Outcome: Ongoing      Problem: OXYGENATION/RESPIRATORY FUNCTION  Goal: Patient will achieve/maintain normal respiratory rate/effort  Respiratory rate and effort will be within normal limits for the patient   Outcome: Ongoing    Goal: Patient will maintain patent airway  Outcome: Ongoing      Problem: SKIN INTEGRITY  Goal: Skin integrity is maintained or improved  Outcome: Ongoing

## 2018-03-19 NOTE — PROGRESS NOTES
Physical Therapy    Facility/Department: [address] MICU  Initial Assessment    NAME: Balbir Love  : 1950  MRN: [id number]    Date of Service: 3/19/2018  Chief Complaint   Patient presents with    Cardiac Arrest     Post Arrest       Patient Diagnosis(es): The primary encounter diagnosis was Cardiac arrest [facility]). Diagnoses of Sepsis, due to unspecified organism [facility]) and Urinary tract infection without hematuria, site unspecified were also pertinent to this visit. has a past medical history of Abnormal stress test; Adiposity; Anemia in chronic kidney disease; Asthma; AV fistula stenosis ([facility]); CAD (coronary artery disease); Cervical radiculopathy; Chest pain; Chronic kidney disease; COPD (chronic obstructive pulmonary disease) ([facility] Diabetes mellitus type 2 in obese ([facility] Dialysis patient [facility]); End stage renal disease ([facility] Gout; History of myocardial infarct at age less than 60 years; HTN (hypertension); Hyperlipidemia; Macular degeneration; Myocardial infarct; Obstructive sleep apnea; On home oxygen therapy; PAD (peripheral artery disease) ([facility] and Tremor. has a past surgical history that includes Coronary angioplasty with stent; Dialysis fistula creation (10/03/2017); Cardiac catheterization (2018); Coronary angioplasty with stent (2018); Umbilical hernia repair (); and Cholecystectomy.     Restrictions  Restrictions/Precautions  Restrictions/Precautions: General Precautions  Required Braces or Orthoses?: Yes (applied foot drop splint to R foot)  Position Activity Restriction  Other position/activity restrictions: Intubated and sedated  Vision/Hearing        Subjective  General  Chart Reviewed: Yes  Patient assessed for rehabilitation services?: Yes  Response To Previous Treatment: Not applicable  Family / Caregiver Present: Yes (friend present)  Follows Commands: Within Functional Limits  General Comment  Comments: applied foot drop splint to R ankle, BLE's elevated assess: pt intubated and sedated       PROM  UE: shoulder flexion/external rotation/internal rotation, elbow flexion/extension, forearm pronation/supination, wrist flexion/extension, finger flexion/extension. Reps: x 12 ea  LE: Hip abduction, ankle dorsiflexion/plantarflexion. Reps x 12 ea. Attempted hip and knee flexion, but unable to range d/t hypertonicity. Gastroc stretch: 2 x 30 seconds ea  Foot drop splint applied to R foot; RN notified and wear schedule posted. Assessment   Body structures, Functions, Activity limitations: Decreased functional mobility ; Decreased ROM  Assessment: Pt intubated and sedated. PROM x 4 extremities. Pt is unsafe to return home at current functional status and would benefit from continued acute/post acute therapy to address previously listed deficits. Prognosis: Fair  Decision Making: Low Complexity  Patient Education: PROM purpose  Barriers to Learning: cognition, sedation   REQUIRES PT FOLLOW UP: Yes  Activity Tolerance  Activity Tolerance: Patient limited by cognitive status  Activity Tolerance: pt intubated and sedated  PT Equipment Recommendations  Other: TBD       Plan   Plan  Times per week: 2-3x/week  Times per day: Daily  Current Treatment Recommendations: ROM, Functional Mobility Training, Safety Education & Training, Patient/Caregiver Education & Training  Safety Devices  Type of devices: Patient at risk for falls, Left in bed, Nurse notified  Restraints  Initially in place: No    G-Code  PT G-Codes  Functional Assessment Tool Used: [place] [facility]  Functional Limitation: Mobility: Walking and moving around  Mobility: Walking and Moving Around Current Status (): 100 percent impaired, limited or restricted  Mobility: Walking and Moving Around Goal Status ():  At least 80 percent but less than 100 percent impaired, limited or restricted    AM-PAC Score     AM-PAC Inpatient Mobility without Stair Climbing Raw Score : 5  AM-PAC Inpatient without Stair Climbing T-Scale Score : 23.59  Mobility Inpatient CMS 0-100% Score: 100  Mobility Inpatient without Stair CMS G-Code Modifier : CN       Goals  Short term goals  Time Frame for Short term goals: 14 visits   Short term goal 1: pt to follow commands off sedation  Short term goal 2: prevent contractures x 4 extremities  Short term goal 3: progress mobility as medically able   Patient Goals   Patient goals : pt unable to state: pt intubated and sedated       Therapy Time   Individual Concurrent Group Co-treatment   Time In 1503         Time Out 1529         Minutes 26         Timed Code Treatment Minutes: 15 Minutes       PATRICK Wild  Evaluation/treatment performed by Student PT under the supervision of co-signing PT who agrees with all evaluation/treatment and documentation.

## 2018-03-19 NOTE — PROGRESS NOTES
03/19/18 0850   Vent Information   Vent Mode CPAP   Pressure Support 6 cmH20   PEEP/CPAP 5     Wean trial started at 0850.    1155 placed pt back on PRVC to rest. Pt not awake and not following commands.

## 2018-03-19 NOTE — PROGRESS NOTES
Problem List:     Cardiac arrest ([facility]     End stage renal disease ([facility]     Pyocystis      Plan of Treatment:   1. Start lipitor  2. Start Coreg-Increase dose as BP tolerates. 3. TTE to assess LVEF      Electronically signed by Michel Fajardo MD on 3/19/2018 at 10:58 AM  Cardiology Fellow    Attending Physician Statement  I have discussed the care of Nadiya Fletcher, including pertinent history and exam findings,  with the resident. I have seen and examined the patient and the key elements of all parts of the encounter have been performed by me. I agree with the assessment, plan and orders as documented by the resident.

## 2018-03-19 NOTE — PLAN OF CARE
IMPROVE AERATION/BREATH SOUNDS  [x]   ADMINISTER BRONCHODILATOR THERAPY AS APPROPRIATE  [x]   ASSESS BREATH SOUNDS  [x]   IMPLEMENT AEROSOL/MDI PROTOCOL  [x]   PATIENT EDUCATION AS NEEDED

## 2018-03-19 NOTE — PROGRESS NOTES
NEPHROLOGY PROGRESS NOTE      SUBJECTIVE     Status post therapeutic hypothermia and duly completed rewarming at 8:00 today morning. Not much neurological response at this point of time. Blood pressures are stable. On mechanical ventilation. Scheduled for hemodialysis today. OBJECTIVE     Vitals:    03/19/18 0630 03/19/18 0645 03/19/18 0820 03/19/18 0850   BP:       Pulse: 57 57 72 78   Resp: 15 15 15 15   Temp:       TempSrc:       SpO2: 100% 100% 100% 100%   Weight: 259 lb 4.2 oz (117.6 kg)      Height:         24HR INTAKE/OUTPUT:    Intake/Output Summary (Last 24 hours) at 03/19/18 0939  Last data filed at 03/19/18 0630   Gross per 24 hour   Intake             2309 ml   Output              624 ml   Net             1685 ml       General appearance:MV  HEENT: MV  Respiratory::vesicular breath sounds,no wheeze/crackles  Cardiovascular:S1 S2 normal,no gallop or organic murmur. Abdomen:Non tender/non distended. Bowel sounds present  Extremities: No Cyanosis or Clubbing,Lower extremity edema  Neurological:MV      MEDICATIONS     Scheduled Meds:    aspirin  81 mg Oral Daily    clopidogrel  75 mg Oral Daily    acyclovir  10 mg/kg (Ideal) Intravenous Q24H    famotidine  20 mg Oral Daily    hydrocortisone sodium succinate PF  100 mg Intravenous Q8H    sodium chloride flush  10 mL Intravenous 2 times per day    vancomycin (VANCOCIN) intermittent dosing (placeholder)   Other RX Placeholder    heparin (porcine)  5,000 Units Subcutaneous 3 times per day    fentanNYL  50 mcg Intravenous Once    cefepime  1 g Intravenous Q24H    fluconazole  200 mg Intravenous Q48H     Continuous Infusions:    dextrose 5 % and 0.9 % NaCl 50 mL/hr at 03/19/18 0903    norepinephrine (LEVOPHED) infusion Stopped (03/17/18 2141)    dextrose      cisatracurium (NIMBEX) infusion Stopped (03/19/18 0620)    midazolam Stopped (03/17/18 2122)    propofol Stopped (03/19/18 0738)    sodium chloride 10 mL/hr at 03/19/18 0600   

## 2018-03-20 PROBLEM — G93.1 ANOXIC ENCEPHALOPATHY (HCC): Status: ACTIVE | Noted: 2018-01-01

## 2018-03-20 NOTE — PLAN OF CARE
Ongoing      Problem: Risk for Impaired Skin Integrity  Goal: Tissue integrity - skin and mucous membranes  Structural intactness and normal physiological function of skin and  mucous membranes.    Outcome: Ongoing      Problem: Falls - Risk of:  Goal: Will remain free from falls  Will remain free from falls   Outcome: Ongoing    Goal: Absence of physical injury  Absence of physical injury   Outcome: Ongoing

## 2018-03-20 NOTE — PROGRESS NOTES
[facility] Nurse  Consult Note       NAME:  Gildardo Rothman  MEDICAL RECORD NUMBER:  [id number]  AGE: 70 y.o. GENDER: male  : 1950  TODAY'S DATE:  3/20/2018    Subjective:     Reason for WOCN Evaluation and Assessment: coccygeal blanchable erythematic area noted on admission has enlarged. Texture change, slow to presley, butterfly shape. Gildardo Rothman is a 70 y.o. male referred by:   [] Physician  [] Nursing  [] Other:     Wound Identification:  Wound Type: pressure, ischemic injury  Contributing Factors: diabetes, chronic pressure, decreased mobility, shear force, obesity, arterial insufficiency, decreased tissue oxygenation and immunosuppression     Patient suffered a cardiac arrest at home. Unclear down time. Suspected deep tissue injuries are purple or maroon localized areas of discolored, intact skin or blood filled blisters due to damage of underlying soft tissue from pressure and/or shear. The area may be preceded by tissue that is painful, firm, mushy, boggy, or warmer or cooler than adjacent tissue. Evolution may include a thin blister over a dark wound bed. The wound may further evolve and become covered by thin eschar. Evolution may be rapid exposing additional layers of tissue even with optimal treatment. These areas may \"suddenly\" appear as the visible skin color change can occur days after the initial insult of unrelieved pressure and reperfusion. Careful skin assessment of bony prominences is critical to the early identification of these injuries.          PAST MEDICAL HISTORY        Diagnosis Date    Abnormal stress test 2018    Adiposity     Anemia in chronic kidney disease 10/02/2017    Asthma     never a smoker    AV fistula stenosis ([address]     CAD (coronary artery disease)     Cervical radiculopathy 2018    Chest pain 2018    Chronic kidney disease     COPD (chronic obstructive pulmonary disease) ([address] 10/02/2017    Diabetes mellitus type 2 in obese [facility])     Dialysis patient [facility])     M-W-F [facility] dialysis at [facility] End stage renal disease ([facility]     Gout     History of myocardial infarct at age less than 61 years    [facility] HTN (hypertension)     Hyperlipidemia     Macular degeneration 10/02/2017    Myocardial infarct     Obstructive sleep apnea     On home oxygen therapy     3-4L NC    PAD (peripheral artery disease) (HCC)     Tremor     hed and upper extremity/ non-parkinsons       PAST SURGICAL HISTORY    Past Surgical History:   Procedure Laterality Date    CARDIAC CATHETERIZATION  03/12/2018    CHOLECYSTECTOMY      CORONARY ANGIOPLASTY WITH STENT PLACEMENT      CORONARY ANGIOPLASTY WITH STENT PLACEMENT  03/12/2018    DIALYSIS FISTULA CREATION  74/43/5577    UMBILICAL HERNIA REPAIR  1996       FAMILY HISTORY    History reviewed. No pertinent family history.     SOCIAL HISTORY    Social History   Substance Use Topics    Smoking status: Never Smoker    Smokeless tobacco: Never Used    Alcohol use No       ALLERGIES    Allergies   Allergen Reactions    Penicillin V Potassium Shortness Of Breath           Objective:      BP (!) 156/54   Pulse 93   Temp 98.2 °F (36.8 °C) (Oral)   Resp 19   Ht 5' 11\" (1.803 m)   Wt 252 lb 10.4 oz (114.6 kg)   SpO2 100%   BMI 35.24 kg/m²   Wali Risk Score: Wali Scale Score: 10    LABS    CBC:   Lab Results   Component Value Date    WBC 20.9 03/20/2018    RBC 3.13 03/20/2018    HGB 10.1 03/20/2018     CMP:  Albumin:    Lab Results   Component Value Date    LABALBU 3.4 03/17/2018     PT/INR:    Lab Results   Component Value Date    PROTIME 11.1 03/19/2018    INR 1.0 03/19/2018     HgBA1c:  No results found for: LABA1C  PTT: No components found for: LABPTT      Assessment:     Patient Active Problem List   Diagnosis    Cardiac arrest ([facility]    End stage renal disease ([facility]    Pyocystis    Bandemia    Complicated UTI (urinary tract infection)    Acute encephalopathy    Lactic acidosis

## 2018-03-20 NOTE — CONSULTS
[facility] - Initial Consult Note  Today's Date and Time: 3/20/2018, 12:23 PM  admission date 3/17/2018  Impression :   Current:  · lactic acidosis Improved  · SIRS  · septic shock  · Leukocytosis  · Acute encephalopathy  · Severe hypoglycemia, with elevated brain insult  · Probable Pyocystitis -Complicated UTI, purulent urine  · DM - severe hypoglycemia/ hypothermia  at admission    Other:  ·   Discussion    · hypoglycemic episode could be the cause or the result of the encephalopathy and sepsis. · probable pyocystis as he seems to have a complicated UTI, bladder irrigation can help clear some of the pus. · Less likely HD access related sepsis  or intestinal ischemia cant be R/O though less likely to be transmural ( CT AP wo colitis). · Concerns for a CNS infection like encephalitis or even meningitis and he will benefit from LP. Recommendations   · Stop vanco and cefepime   · Keep acyclovir and fluconazole  · Acyclovir till LP can be done to R/o acute HSV encephalitis  · LP on hold for another 3 days due to intake of Plavix and aspirin  · Prognosis is poor due to hypoglycemic brain insult and persistent encephalopathy  · bladder irrigation for pyocystis, seems to be improving  · Discussed with nurse on the bedside      Infection Control Recommendations   · [place] Precautions    Antimicrobial Stewardship Recommendations   · Simplification of therapy  · Targeted therapy      Chief complaint/reason for consultation:   Mental status changes/ sepsis    History of Present Illness:   Initial history:  Luba Morales is a 70y.o.-year-old  male who was initially admitted on 3/17/2018. Pt is DM w HD left UE AVF, was acting not himself in HD yesterday and dish early, left home and was not waking up, wife found him and called EMS, who found him in asystole, hypoglycemia, resuscitated, spontaneous circulation 11 min later,, went to [facility] ER then to us.    Wife noticed some 3/19/18  Technically difficult study. Global left ventricular systolic function is hyperdynamic with small LV  cavity/low stroke volume and Estimated ejection fraction is > 65% . Hypertensive and ischemic heart disease  senile calcific valvular disease with mild aortic stenosis  Small localized pericardial effusion      I have personally reviewed the past medical history, past surgical history, medications, social history, and family history, and I have updated the database accordingly.   Past Medical History:     Past Medical History:   Diagnosis Date    Abnormal stress test 03/07/2018    Adiposity     Anemia in chronic kidney disease 10/02/2017    Asthma     never a smoker    AV fistula stenosis ([address]     CAD (coronary artery disease)     Cervical radiculopathy 03/07/2018    Chest pain 03/07/2018    Chronic kidney disease     COPD (chronic obstructive pulmonary disease) ([facility] 10/02/2017    Diabetes mellitus type 2 in obese ([facility]     Dialysis patient [facility])     M-W-F [facility] dialysis at [address] End stage renal disease ([facility]     Gout     History of myocardial infarct at age less than 61 years    [facility] HTN (hypertension)     Hyperlipidemia     Macular degeneration 10/02/2017    Myocardial infarct     Obstructive sleep apnea     On home oxygen therapy     3-4L NC    PAD (peripheral artery disease) (HCC)     Tremor     hed and upper extremity/ non-parkinsons       Past Surgical  History:     Past Surgical History:   Procedure Laterality Date    CARDIAC CATHETERIZATION  03/12/2018    CHOLECYSTECTOMY      CORONARY ANGIOPLASTY WITH STENT PLACEMENT      CORONARY ANGIOPLASTY WITH STENT PLACEMENT  03/12/2018    DIALYSIS FISTULA CREATION  05/56/6133    UMBILICAL HERNIA REPAIR  1996       Medications:      carvedilol  3.125 mg Oral BID WC    pantoprazole  40 mg Intravenous BID    insulin lispro  0-6 Units Subcutaneous TID WC    insulin lispro  0-3 Units Subcutaneous Nightly    fluconazole  200 mg Intravenous Q24H    ipratropium-albuterol  1 ampule Inhalation 4x daily    albuterol  2.5 mg Nebulization BID    aspirin  81 mg Oral Daily    clopidogrel  75 mg Oral Daily    acyclovir  10 mg/kg (Ideal) Intravenous Q24H    sodium chloride flush  10 mL Intravenous 2 times per day    fentanNYL  50 mcg Intravenous Once       Social History:     Social History     Social History    Marital status:      Spouse name: N/A    Number of children: N/A    Years of education: N/A     Occupational History    Not on file. Social History Main Topics    Smoking status: Never Smoker    Smokeless tobacco: Never Used    Alcohol use No    Drug use: No    Sexual activity: Not on file     Other Topics Concern    Not on file     Social History Narrative    No narrative on file       Family History:   History reviewed. No pertinent family history. Allergies:   Penicillin v potassium     Review of Systems:     Review of Systems   Unable to perform ROS: Patient unresponsive       Physical Examination :     Patient Vitals for the past 8 hrs:   BP Temp Temp src Pulse Resp SpO2   03/20/18 1100 (!) 156/54 - - 93 19 -   03/20/18 1000 (!) 158/44 - - 91 11 -   03/20/18 0900 (!) 117/43 - - 81 15 -   03/20/18 0800 (!) 120/51 98.2 °F (36.8 °C) Oral 92 12 -   03/20/18 0700 138/86 - - 104 13 100 %   03/20/18 0600 (!) 119/55 - - 97 11 100 %   03/20/18 0500 119/66 - - 100 11 100 %       Physical Exam   Constitutional: He is well-developed, well-nourished, and in no distress. No distress. HENT:   Head: Normocephalic and atraumatic. Right Ear: External ear normal.   Left Ear: External ear normal.   Mouth/Throat: No oropharyngeal exudate. Eyes: Conjunctivae are normal. No scleral icterus. Neck: Neck supple. No tracheal deviation present. Cardiovascular: Normal rate and normal heart sounds. No murmur heard. Pulmonary/Chest: Effort normal and breath sounds normal. No respiratory distress. He has no rales. of this patient. Please call with questions.     Simone Evans MD  Office: [phone]

## 2018-03-20 NOTE — PROGRESS NOTES
[facility]   Progress Note                   Date:   3/20/2018  Patient name: Keith Cleveland  Date of admission:  3/17/2018 12:55 PM  MRN:   [id number]  YOB: 1950  PCP: Anil Hart MD    Reason for Admission:     Subjective:       Clinical Changes / Abnormalities:Patient is intubated and sedated. He is off sedation and unresponsive.        Medications:   Scheduled Meds:   carvedilol  3.125 mg Oral BID WC    pantoprazole  40 mg Intravenous BID    insulin lispro  0-6 Units Subcutaneous TID WC    insulin lispro  0-3 Units Subcutaneous Nightly    fluconazole  200 mg Intravenous Q24H    vancomycin  750 mg Intravenous Once per day on Tue Thu Sat    ipratropium-albuterol  1 ampule Inhalation 4x daily    albuterol  2.5 mg Nebulization BID    aspirin  81 mg Oral Daily    clopidogrel  75 mg Oral Daily    acyclovir  10 mg/kg (Ideal) Intravenous Q24H    sodium chloride flush  10 mL Intravenous 2 times per day    fentanNYL  50 mcg Intravenous Once    cefepime  1 g Intravenous Q24H     Continuous Infusions:   norepinephrine (LEVOPHED) infusion Stopped (03/17/18 2141)    propofol Stopped (03/19/18 0738)    sodium chloride 50 mL/hr at 03/20/18 0545    dextrose       CBC:   Recent Labs      03/18/18   0410  03/19/18   0427  03/20/18   0422   WBC  16.4*  14.1*  20.9*   HGB  9.4*  8.5*  10.1*   PLT  166  155  243     BMP:    Recent Labs      03/19/18   0427  03/19/18   0700  03/20/18   0135  03/20/18   0422   NA  127*   --   131*  133*   K  4.3  5.0  4.3  4.8   CL  89*   --   90*  92*   CO2  19*   --   24  23   BUN  50*   --   22  26*   CREATININE  8.58*   --   4.84*  5.27*   GLUCOSE  192*   --   220*  207*     Hepatic:   Recent Labs      03/17/18   1137  03/17/18 2028   AST  72*  66*   ALT  74*  70*   BILITOT  0.42  0.38   ALKPHOS  96  96     Troponin:   Recent Labs      03/17/18   1307  03/17/18   1504   TROPONINI  0.25*  0.34*     BNP: No results for input(s): BNP in the last 72 hours. Lipids: No results for input(s): CHOL, HDL in the last 72 hours. Invalid input(s): LDLCALCU  INR:   Recent Labs      03/18/18   0858  03/18/18   1808  03/19/18   0427   INR  1.0  1.0  1.0       Objective:   Vitals: BP (!) 117/43   Pulse 81   Temp 98.2 °F (36.8 °C) (Oral)   Resp 15   Ht 5' 11\" (1.803 m)   Wt 252 lb 10.4 oz (114.6 kg)   SpO2 100%   BMI 35.24 kg/m²   General appearance: Intubated  HEENT: Et tube in place  Neck: No carotid bruit, no JVD, supple, symmetrical, trachea midline and thyroid not enlarged, symmetric, no tenderness/mass/nodules  Lungs: clear to auscultation bilaterally  Heart: regular rate and rhythm, S1, S2 normal, no murmur, click, rub or gallop  Abdomen: soft, non-tender; bowel sounds normal; no masses,  no organomegaly  Extremities: extremities normal, atraumatic, no cyanosis or edema  Neurologic: Mental status: Alert, oriented, thought content appropriate    EKG:   SR, RBBB/LAFB, 1st degree av block         Cardiac cath and PCI 3/12/2018  1. Severe native vessel CAD.     2. Patent SCG-OM2 and LIMA-LAD.     3. Occluded SVG-RCA and occluded Radial to large OM1    4. Distal RCA is filling via left to right collaterals from LAD.     5. Successful PCI of OM1 with BMS (BMS used due to upcomming spine surgery    Assessment / Acute Cardiac Problems:   Asystole cardiac arrest-NSTEMI- Trop max 0.38 -aspirin, plavix, coreg-Troponin leak from type II MI  Multivessel CAD with recent BMS-native OM1.   Leucocytosis- WBC 20.9  Acute hypoxic resp failure  Acute encephalopathy   Possible acute HSV virus  Complicated UTI with possible pyocystitis   ESRD on HD   Severe hypoglycemia   Lactic acidosis   Diabetes mellitus        Patient Active Problem List:     Cardiac arrest ([facility]     End stage renal disease ([facility]     Pyocystis      Plan of Treatment:   Patient does not need any ischemic workup and cause of AMS needs to be assessed  Continue current cardiac medications    Electronically signed by Esther Thrasher MD on 3/20/2018 at 10:36 AM  Cardiology Fellow    I performed a history and physical examination of the patient and discussed management with the resident. I reviewed the residents note and agree with the documented findings and plan of care. Any areas of disagreement are noted on the chart. I was personally present for the key portions of any procedures. I have documented in the chart those procedures where I was not present during the key portions. I have personally evaluated this patient and have completed at least one if not all key elements of the E/M (history, physical exam, and MDM). Additional findings are as noted.     Heather Domingo MD

## 2018-03-20 NOTE — CONSULTS
Oral PRN Tommie Bernabe MD        dextrose 50 % solution 12.5 g  12.5 g Intravenous PRN Tommie Bernabe MD   25 g at 03/18/18 0010    glucagon (rDNA) injection 1 mg  1 mg Intramuscular PRN Tommie Bernabe MD        dextrose 5 % solution  100 mL/hr Intravenous PRN Tommie Bernabe MD           Allergies   Allergen Reactions    Penicillin V Potassium Shortness Of Breath       ROS:  Unable on ventilator    Vitals:    03/20/18 0900   BP: (!) 117/43   Pulse: 81   Resp: 15   Temp:    SpO2:      Admission weight: 260 lb (117.9 kg)    Neurological Examination on ventilator on no sedation   Head/ Ears /Nose/Throat/external ear . Oral intubation   Neck and thyroid . Normal size. Cardiovascular: Auscultation of heart with regular rate and rhythm   Musculoskeletal. Muscle tone increased in bilateral arms and legs   No posturing   Assiting ventilator   Comatose   Pupils bilaterally 4 mm round and reactive   Present corneals   Present oculocephalics   Deep Tendon Reflexes symmetrical  Plantar response flexor bilaterally    Assessment :    Anoxic encephalopathy . Possible toxic metabolic component . Cardiac arrest . Urosepsis     Plan:    EEG .  MRI of Head on ventilator

## 2018-03-20 NOTE — PROGRESS NOTES
Intake/Output Summary (Last 24 hours) at 03/20/18 1159  Last data filed at 03/20/18 0600   Gross per 24 hour   Intake             2620 ml   Output             4984 ml   Net            -2364 ml       Date 03/20/18 0000 - 03/20/18 2359   Shift 2589-0751 0366-3667 1780-5963 24 Hour Total   I  N  T  A  K  E   I.V.  (mL/kg) 412  (3.6)   412  (3.6)    Shift Total  (mL/kg) 412  (3.6)   412  (3.6)   O  U  T  P  U  T   Urine  (mL/kg/hr) 65  (0.1)   65    Emesis/NG output  (mL/kg) 500  (4.4)   500  (4.4)    Shift Total  (mL/kg) 565  (4.9)   565  (4.9)   Weight (kg) 114.6 114.6 114.6 114.6     Wt Readings from Last 3 Encounters:   03/20/18 252 lb 10.4 oz (114.6 kg)   03/17/18 242 lb 8.1 oz (110 kg)     Body mass index is 35.24 kg/m².         PHYSICAL EXAM:  GEN:  Intubated, sedated overnight but off this am  EYES:   3mm slugglishly reactive on L but sluggish, non reactive on right  LUNGS:  CTAB, equal rise and fall of chest  CV:    RRR, no murmurs  ABDOMEN:   Soft, non-distended  :    no bleeding per ureteral meatus, bruising over suprapubic region  NEURO[de-identified]   Intubated, off sedation at this moment, no movement during my exam  EXTREMITIES:  No pedal or leg edema, no calf swelling, no erythema, distal pulses intact       MEDICATIONS:  Scheduled Meds:   carvedilol  3.125 mg Oral BID WC    pantoprazole  40 mg Intravenous BID    insulin lispro  0-6 Units Subcutaneous TID WC    insulin lispro  0-3 Units Subcutaneous Nightly    fluconazole  200 mg Intravenous Q24H    vancomycin  750 mg Intravenous Once per day on Tue Thu Sat    ipratropium-albuterol  1 ampule Inhalation 4x daily    albuterol  2.5 mg Nebulization BID    aspirin  81 mg Oral Daily    clopidogrel  75 mg Oral Daily    acyclovir  10 mg/kg (Ideal) Intravenous Q24H    sodium chloride flush  10 mL Intravenous 2 times per day    fentanNYL  50 mcg Intravenous Once    cefepime  1 g Intravenous Q24H     Continuous Infusions:   norepinephrine (LEVOPHED) infusion Stopped (03/17/18 3125)    propofol Stopped (03/19/18 3088)    sodium chloride 50 mL/hr at 03/20/18 0545    dextrose       PRN Meds:     albuterol 2.5 mg As Directed RT PRN   morphine 2 mg Q4H PRN   Or     morphine 4 mg Q4H PRN   sodium chloride flush 10 mL PRN   acetaminophen 650 mg Q4H PRN   ondansetron 4 mg Q6H PRN   potassium chloride 10 mEq PRN   potassium chloride 20 mEq PRN   magnesium sulfate 1 g PRN   sodium phosphate IVPB 0.16 mmol/kg PRN   Or     sodium phosphate IVPB 0.32 mmol/kg PRN   midazolam 2 mg Q30 Min PRN   glucose 15 g PRN   dextrose 12.5 g PRN   glucagon (rDNA) 1 mg PRN   dextrose 100 mL/hr PRN       SUPPORT DEVICES: [x] Ventilator [] BIPAP  [] Nasal Cannula [] Room Air    VENT SETTINGS (Comprehensive) (if applicable):  Vent Information  Ventilator Started: Yes  Ventilation Day(s): 2  Vent Type: Servo i  Vent Mode: PRVC  Vt Ordered: 600 mL  Rate Set: 12 bmp  Pressure Support: 2 cmH20  FiO2 : 30 %  Sensitivity: 2  PEEP/CPAP: 5  I Time/ I Time %: 0.9 s  Cuff Pressure (cm H2O):  (mov)  Humidification Source: HME  Additional Respiratory  Assessments  Pulse: 93  Resp: 19  SpO2: 100 %  End Tidal CO2: 50 (%)  Position: Semi-Cordon's  Humidification Source: HME  Oral Care Completed?: Yes  Oral Care: Teeth brushed  Subglottic Suction Done?: Yes  Cuff Pressure (cm H2O):  (mov)  Skin barrier applied: Yes    ABGs:   Lab Results   Component Value Date    [id number] 28 03/20/2018    FIO2 30.0 03/20/2018         DATA:  Complete Blood Count:   Recent Labs      03/18/18   0410  03/19/18   0427  03/20/18   0422   WBC  16.4*  14.1*  20.9*   RBC  2.92*  2.67*  3.13*   HGB  9.4*  8.5*  10.1*   HCT  29.5*  26.9*  31.4*   MCV  101.0  100.7  100.3   MCH  32.2  31.8  32.3   MCHC  31.9  31.6  32.2   RDW  13.8  13.8  13.7   PLT  166  155  243   MPV  10.6  10.3  10.1        Last 3 Blood Glucose:   Recent Labs      03/17/18   2028  03/17/18   2238  03/18/18   0409  03/18/18   1152  03/18/18   1808  03/19/18   0427 03/20/18 0135 03/20/18   0422   GLUCOSE  4*  76  117*  131*  154*  192*  220*  207*        PT/INR:    Lab Results   Component Value Date    PROTIME 11.1 03/19/2018    INR 1.0 03/19/2018     PTT:    Lab Results   Component Value Date    APTT 26.9 03/19/2018       Comprehensive Metabolic Profile:   Recent Labs      03/17/18 2028 03/19/18   0427  03/19/18   0700  03/20/18 0135 03/20/18   0422   NA  137   < >  127*   --   131*  133*   K  4.1   < >  4.3  5.0  4.3  4.8   CL  98   < >  89*   --   90*  92*   CO2  23   < >  19*   --   24  23   BUN  40*   < >  50*   --   22  26*   CREATININE  7.39*   < >  8.58*   --   4.84*  5.27*   GLUCOSE  4*   < >  192*   --   220*  207*   CALCIUM  8.6   < >  8.6   --   7.9*  8.6   PROT  5.8*   --    --    --    --    --    LABALBU  3.4*   --    --    --    --    --    BILITOT  0.38   --    --    --    --    --    ALKPHOS  96   --    --    --    --    --    AST  66*   --    --    --    --    --    ALT  70*   --    --    --    --    --     < > = values in this interval not displayed.       Magnesium:   Lab Results   Component Value Date    MG 1.5 03/20/2018    MG 1.9 03/18/2018    MG 2.0 03/18/2018     Phosphorus:   Lab Results   Component Value Date    PHOS 3.5 03/20/2018    PHOS 5.5 03/18/2018    PHOS 5.2 03/18/2018     Ionized Calcium:   Lab Results   Component Value Date    CAION 1.04 03/20/2018    CAION 1.06 03/20/2018    CAION 0.97 03/20/2018        Urinalysis:   Lab Results   Component Value Date    NITRU POSITIVE 03/17/2018    COLORU BROWN 03/17/2018    PHUR 6.5 03/17/2018    WBCUA TOO NUMEROUS TO COUNT 03/17/2018    RBCUA 20 TO 50 03/17/2018    MUCUS NOT REPORTED 03/17/2018    TRICHOMONAS NOT REPORTED 03/17/2018    YEAST MODERATE 03/17/2018    BACTERIA MANY 03/17/2018    SPECGRAV 1.025 03/17/2018    LEUKOCYTESUR MODERATE 03/17/2018    UROBILINOGEN Normal 03/17/2018    BILIRUBINUR NEGATIVE  Verified by ictotest. 03/17/2018    GLUCOSEU 1+ 03/17/2018    KETUA TRACE 03/17/2018 AMORPHOUS NOT REPORTED 03/17/2018       HgBA1c:  No results found for: LABA1C  TSH:  No results found for: TSH  Lactic Acid:   Lab Results   Component Value Date    LACTA 5.4 03/17/2018      Troponin:   Recent Labs      03/17/18   1307  03/17/18   1504   TROPONINI  0.25*  0.34*     ASSESSMENT:     Patient Active Problem List    Diagnosis Date Noted    Anoxic encephalopathy ([facility] 03/20/2018    Sepsis ([facility]     Bandemia     Complicated UTI (urinary tract infection)     Acute encephalopathy     Lactic acidosis     Septic shock (HCC)     Cardiac arrest ([facility] 03/17/2018    End stage renal disease ([facility] 03/17/2018    Pyocystis 03/17/2018          PLAN:     WEAN PER PROTOCOL:  [x] No   [] Yes  [] N/A    ICU PROPHYLAXIS:  Stress ulcer:  [] PPI Agent  [x] [id number] [] Sucralfate  [] Other:  VTE:   [] Enoxaparin  [x] Unfract. Heparin Subcut  [] EPC Cuffs    NUTRITION:  [x] NPO [] Tube Feeding (Specify: ) [] TPN  [] PO    HOME MEDS RECONCILED: [x] No  [] Yes    CONSULTATION NEEDED:  [x] No  [] Yes    FAMILY UPDATED:    [] No  [x] Yes    TRANSFER OUT OF ICU:   [x] No  [] Yes        Additional Assessment:  Active Problems:    Cardiac arrest (HCC)    End stage renal disease ([facility]    Pyocystis    Bandemia    Complicated UTI (urinary tract infection)    Acute encephalopathy    Lactic acidosis    Septic shock (HCC)    Anoxic encephalopathy ([facility]    Sepsis ([facility]  Resolved Problems:    * No resolved hospital problems. *     Cardiac arrest likely secondary to septic shock from UTI, though ACS not ruled out    Plan:  · Per ID will cont Diflucan, acyclovir  · Off sedation, observe neuro status.  Will consult neuro and get IR LP as per ID recs to rule out encephalitis  · Vent management per protocol  · f/u ID recs: may need LP, encephalitis in ddx  ·  monitor blood sugars  · Cont bladder irrigation  · F/u cardiology recs:cardiac arrest unlikely from ACS, though may need stress test or cath this hospitalization once more stable

## 2018-03-20 NOTE — CONSULTS
[facility] - Initial Consult Note  Today's Date and Time: 3/19/2018, 10:14 PM  admission date 3/17/2018  Impression :   Current:  · lactic acidosis  · SIRS  · septic shock  · Leukocytosis  · Acute encephalopathy  · Probable Pyocystitis -Complicated UTI, purulent urine  · DM - severe hypoglycemia/ hypothermia  at admission    Other:  ·   Discussion    · hypoglycemic episode could be the cause or the result of the encephalopathy and sepsis. · probable pyocystis as he seems to have a complicated UTI, bladder irrigation can help clear some of the pus. · Less likely HD access related sepsis  or intestinal ischemia cant be R/O though less likely to be transmural ( CT AP wo colitis). · Concerns for a CNS infection like encephalitis or even meningitis and he will benefit from LP. Recommendations   · vanco and cefepime fluconazole  · Acyclovir till LP can be done to R/o acute HSV encephalitis  · LP once stable enough to tolerate it  · bladder irrigation  · Will dis cw family for more detailed history  · Case disc w RN and CC resident    Infection Control Recommendations   · [place] Precautions    Antimicrobial Stewardship Recommendations   · Simplification of therapy  · Targeted therapy      Chief complaint/reason for consultation:   Mental status changes/ sepsis    History of Present Illness:   Initial history:  Nicolás Christianson is a 70y.o.-year-old  male who was initially admitted on 3/17/2018. Pt is DM w HD left UE AVF, was acting not himself in HD yesterday and dish early, left home and was not waking up, wife found him and called EMS, who found him in asystole, hypoglycemia, resuscitated, spontaneous circulation 11 min later,, went to [facility] ER then to us. Wife noticed some unusual behavior x 1 day.   here the urine was very cloudy and a pruitt was placed, mottled w septic shock and was on levophed over night then stopped this am, hypoglycemia noticed at admission as low as 4, corrected and remains on a warmer for hypo glycemia, sedated and paralyzed, mottled knees and lactic acid 5.4, leukocytosis, and cx pending. CXR no pneumonia and CT chest showed some scars and bilat ribs stable fractures w minimal pneumothorax, while the sputum is clear, left AVF w a thrill and no signs of inflammation. CT AP neg for any mass or abscess. We are called to eval the patient. All cx are still pend. Interval changes 03/19/18    Still on a warmer, no pressors and all sedatives stopped, not responding, BP elevated,  FIO2 of 30 and 5 peep on the vent intubated. Getting bladder irrigation and U clearing  Echo done neg for vegetation but of poor visualization. Long disc w family- low BS at admission and before admission while resuscitated by the EMS, might carry a poor brain prognosis. All blood cx are neg  WBC improved   . Summary of relevant labs:  Labs:  WBc 25-16-14  Micro:    Imaging:  cxr 3/18/18 neg for infection  CT brain 3/17/18   No acute intracranial abnormality. CT AP and chest  3/17/18  Trace bilateral pleural effusion with left basilar consolidation greater  on the right. 2. Cardiomegaly. 3.  Calcific atherosclerotic disease aorta. 4. Benign right adrenal adenoma. 5. Cholecystectomy. 6. Atrophic kidneys with bilateral renal cysts. Nonobstructing calculus  midpole right kidney measures 2 mm. Left basilar opacity, favoring atelectasis or scarring. Infectious process  is possible in the proper clinical setting. Left 2nd through 6th and right 3rd/ 4th rib fractures, nondisplaced. Trace  right intrapleural air, suggesting tiny pneumothorax. I have personally reviewed the past medical history, past surgical history, medications, social history, and family history, and I have updated the database accordingly.   Past Medical History:     Past Medical History:   Diagnosis Date    Abnormal stress test 03/07/2018    Adiposity     Anemia in chronic kidney disease 10/02/2017    Asthma     never a smoker    AV fistula stenosis ([facility]     CAD (coronary artery disease)     Cervical radiculopathy 03/07/2018    Chest pain 03/07/2018    Chronic kidney disease     [facility] 10/02/2017    Diabetes mellitus type 2 in obese ([facility]     Dialysis patient [facility])     M-W-F [facility] dialysis at [address] End stage renal disease ([facility]     Gout     History of myocardial infarct at age less than 61 years    [facility] HTN (hypertension)     Hyperlipidemia     Macular degeneration 10/02/2017    Myocardial infarct     Obstructive sleep apnea     On home oxygen therapy     3-4L NC    PAD [facility]peripheral artery disease) ([facility]     Tremor     hed and upper extremity/ non-parkinsons       Past Surgical  History:     Past Surgical History:   Procedure Laterality Date    CARDIAC CATHETERIZATION  03/12/2018    CHOLECYSTECTOMY      CORONARY ANGIOPLASTY WITH STENT PLACEMENT      CORONARY ANGIOPLASTY WITH STENT PLACEMENT  03/12/2018    DIALYSIS FISTULA CREATION  56/98/4509    UMBILICAL HERNIA REPAIR  1996       Medications:      carvedilol  6.25 mg Oral BID WC    fluconazole  200 mg Intravenous Q24H    [START ON 3/20/2018] vancomycin  750 mg Intravenous Once per day on Tue Thu Sat    ipratropium-albuterol  1 ampule Inhalation 4x daily    [START ON 3/20/2018] albuterol  2.5 mg Nebulization BID    aspirin  81 mg Oral Daily    clopidogrel  75 mg Oral Daily    acyclovir  10 mg/kg (Ideal) Intravenous Q24H    famotidine  20 mg Oral Daily    sodium chloride flush  10 mL Intravenous 2 times per day    heparin (porcine)  5,000 Units Subcutaneous 3 times per day    fentanNYL  50 mcg Intravenous Once    cefepime  1 g Intravenous Q24H       Social History:     Social History     Social History    Marital status:      Spouse name: N/A    Number of children: N/A    Years of education: N/A     Occupational History    Not on file.      Social 03/19/18 1445 - - - 78 (!) 7 100 % -   03/19/18 1430 - - - 84 14 100 % -   03/19/18 1428 - - - 85 - 100 % -   03/19/18 1415 - - - 78 15 100 % -       Physical Exam   Constitutional: He is well-developed, well-nourished, and in no distress. No distress. HENT:   Head: Normocephalic and atraumatic. Mouth/Throat: No oropharyngeal exudate. Eyes: Conjunctivae are normal. No scleral icterus. Neck: Neck supple. No tracheal deviation present. Cardiovascular: Normal rate and normal heart sounds. No murmur heard. Pulmonary/Chest: Effort normal and breath sounds normal. No respiratory distress. He has no wheezes. Abdominal: Soft. Bowel sounds are normal. He exhibits no distension. There is no tenderness. Musculoskeletal: Normal range of motion. He exhibits no edema or deformity. Neurological:   Mottled knees and legs   Skin: Skin is warm and dry. No rash noted. He is not diaphoretic. No erythema. Psychiatric:   Sedated paralyzed on the vent         Medical Decision Making:   I have independently reviewed/ordered the following labs:    CBC with Differential:   Recent Labs      03/17/18   1137  03/17/18   1310  03/18/18   0410  03/19/18   0427   WBC  18.7*  25.8*  16.4*  14.1*   HGB  11.0*  9.9*  9.4*  8.5*   HCT  34.1*  32.6*  29.5*  26.9*   PLT  260  231  166  155   LYMPHOPCT  3*  3*   --    --    MONOPCT  2  8*   --    --      BMP:   Recent Labs      03/18/18   1152  03/18/18   1808   03/19/18   0427  03/19/18   0700   NA  133*  132*   --   127*   --    K  3.8  3.6*   < >  4.3  5.0   CL  94*  94*   --   89*   --    CO2  20  20   --   19*   --    BUN  47*  48*   --   50*   --    CREATININE  8.18*  8.05*   --   8.58*   --    MG  2.0  1.9   --    --    --     < > = values in this interval not displayed.      Hepatic Function Panel:   Recent Labs      03/17/18   1137  03/17/18 2028   PROT  5.8*  5.8*   LABALBU  3.2*  3.4*   BILIDIR  0.18  0.15   IBILI  0.24  0.23   BILITOT  0.42  0.38   ALKPHOS  96  96 ALT  74*  70*   AST  72*  66*     No results for input(s): RPR in the last 72 hours. No results for input(s): HIV in the last 72 hours. No results for input(s): BC in the last 72 hours. Lab Results   Component Value Date    MUCUS NOT REPORTED 03/17/2018    RBC 2.67 03/19/2018    TRICHOMONAS NOT REPORTED 03/17/2018    WBC 14.1 03/19/2018    YEAST MODERATE 03/17/2018    TURBIDITY TURBID 03/17/2018     Lab Results   Component Value Date    CREATININE 8.58 03/19/2018    GLUCOSE 192 03/19/2018       Detailed results: Thank you for allowing us to participate in the care of this patient. Please call with questions.     Maria Esther Garza MD  Office: [phone]

## 2018-03-20 NOTE — PROGRESS NOTES
Hemodialysis treatment complete. Pre weight 116.2 kg and post 112.3 kg. Patient had liters processed of 116.5 with TFR of 4330 and rinse back of 380. Additional fluid of 200 infused through IV during treatment for net removal of 3750.

## 2018-03-20 NOTE — CONSULTS
pulmonary disease) ([facility] 10/02/2017    Diabetes mellitus type 2 in obese ([facility]     Dialysis patient [facility])     M-W-F [facility] dialysis at [facility] End stage renal disease ([facility]     Gout     History of myocardial infarct at age less than 61 years    Tamea Mao HTN (hypertension)     Hyperlipidemia     Macular degeneration 10/02/2017    Myocardial infarct     Obstructive sleep apnea     On home oxygen therapy     3-4L NC    PAD (peripheral artery disease) (HCC)     Tremor     hed and upper extremity/ non-parkinsons       PAST SURGICAL HISTORY  Past Surgical History:   Procedure Laterality Date    CARDIAC CATHETERIZATION  03/12/2018    CHOLECYSTECTOMY      CORONARY ANGIOPLASTY WITH STENT PLACEMENT      CORONARY ANGIOPLASTY WITH STENT PLACEMENT  03/12/2018    DIALYSIS FISTULA CREATION  43/40/5032    UMBILICAL HERNIA REPAIR  1996       SOCIAL HISTORY  Social History   Substance Use Topics    Smoking status: Never Smoker    Smokeless tobacco: Never Used    Alcohol use No       ALLERGIES  Allergies   Allergen Reactions    Penicillin V Potassium Shortness Of Breath         MEDICATIONS  Current Medications    carvedilol  3.125 mg Oral BID WC    pantoprazole  40 mg Intravenous BID    insulin lispro  0-6 Units Subcutaneous TID WC    insulin lispro  0-3 Units Subcutaneous Nightly    calcium chloride IVPB  1 g Intravenous Once    magnesium sulfate  1 g Intravenous Q1H    fluconazole  200 mg Intravenous Q24H    ipratropium-albuterol  1 ampule Inhalation 4x daily    albuterol  2.5 mg Nebulization BID    aspirin  81 mg Oral Daily    clopidogrel  75 mg Oral Daily    acyclovir  10 mg/kg (Ideal) Intravenous Q24H    sodium chloride flush  10 mL Intravenous 2 times per day    fentanNYL  50 mcg Intravenous Once     albuterol, morphine **OR** morphine, sodium chloride flush, acetaminophen, ondansetron, potassium chloride, potassium chloride, magnesium sulfate, sodium phosphate IVPB **OR** sodium phosphate IVPB, from bedside RN. EEG being performed at bedside and family and writer went to ICU waiting area to talk. Patient's wife Sailaja Rm, her son Hallie Matute, and Patient's daughter Kirit Thomas were present. Writer offered support to family. Patient and wife have been  for 13 years; patient's previous wife  from cancer. Wife and family are finding some comfort in the fact that patient had expressed his wishes to them. Family asked questions related to [facility] and withdrawal of care if it comes to that. Terminal extubation and discontinuation of dialysis discussed with family and potential hospice. Family awaiting results of testing. Education/support to family  Decision making regarding life prolonging treatment  Caregiver support/education  Continue with current plan of care  Code status clarified: [facility]  Code status clarified: [facility]  Validating patient/family distress    1- Symptom management/ pain control       We feel the patient symptoms are  controlled. his current regimen is reviewed by myself and discussed with the staff. 2- Goals of care evaluation   The patient goals of care are improve or maintain function/quality of life, preserve independence/autonomy/control and support for family/caregiver   Goals of care discussed with:    [] Patient independently    [] Patient and Family    [x] Family or Healthcare DPOA independently    [] Unable to discuss with patient, family/DPOA not present    3- Code Status  DNR-CCA    4- Other recommendations  Please call with any palliative questions or concerns. Palliative Care Team is available via perfect serve or via phone. Palliative Care will continue to follow Mr. Sarah Ball care as needed. Thank you for allowing Palliative Care to participate in the care of Mr. Efrain Louie .     Electronically signed by   Debbie Baird RN  Palliative Care Team  on 3/20/2018 at 3:44 PM    Palliative care office: [phone]

## 2018-03-21 NOTE — PROGRESS NOTES
INTERNAL MEDICINE                                                                             ICU PATIENT TRANSFER NOTE        Patient:  Chalmer Mcardle  YOB: 1950  MRN: [id number]     Acct: [de-identified]     Admit date: 3/17/2018    Code Status:-  Full code     Reason for ICU Admission:-       SUPPORT DEVICES: [] Ventilator [] BIPAP  [] Nasal Cannula [x] Room Air    Consultations:- [x] Cardiology [x] Nephrology  [] Hemo onco  [] GI                               [x] ID [] ENT  [] Rheum [] Endo   []Physiotherapy                                 Others:- Neurology    Quattro catheter placed 3/17/18 to R femoral vein    Pt seen and Chart reviewed. ICU COURSE :-     Patient with PMH of ESRD on dialysis, CAD s/p stenting 5 days ago and DM on amaryl at home presented 3/17/18 post asystole cardiac arrest after wife noticed patient's breath very slow and shallow and he was unarousable. EMS was called and CPR was initiated. ROSC was achieved after 11 minutes of ACLS. Patient was extremely hypoglycemic requiring D10 infusion. Quattro was placed and patient was started on hypothermia protocol. Levophed was required for pressor support. ID was consulted for concern for complicated UTI and possible pyocystis. He was started on vanco, cefepime fluconazole. Acyclovir was started and LP ordered to rule out  Meningitis and encephalitis. Cardiology was consulted in light of recent PCI and patient was started on heparin wit ASA and plavix. Neurology was consulted for anoxic encephalopathy and EEG and MRI brain were ordered. MRI brain was consistent was anoxic brain injury and EEG showed presence of moderate to marked diffuse cerebral dysfunction. It was assessed patient has 1% chance for a functional neurologic recovery by neurology.  Decision made by family to change code input(s): LABA1C in the last 72 hours. INR:   Recent Labs      03/19/18   0427   INR  1.0     Hepatic: No results for input(s): ALKPHOS, ALT, AST, PROT, BILITOT, BILIDIR, LABALBU in the last 72 hours. Amylase and Lipase:No results for input(s): LACTA, AMYLASE in the last 72 hours. Lactic Acid: No results for input(s): LACTA in the last 72 hours. CARDIAC ENZYMES:No results for input(s): CKTOTAL, CKMB, CKMBINDEX, TROPONINI in the last 72 hours. BNP: No results for input(s): BNP in the last 72 hours. Lipids:   Recent Labs      03/20/18   0422   TRIG  225*     ABGs: No results found for: PH, PCO2, PO2, HCO3, O2SAT  Thyroid: No results found for: TSH     Urinalysis: No results for input(s): BACTERIA, BLOODU, CLARITYU, COLORU, PHUR, PROTEINU, RBCUA, SPECGRAV, BILIRUBINUR, NITRU, WBCUA, LEUKOCYTESUR, GLUCOSEU in the last 72 hours. Ct Abdomen Pelvis Wo Contrast    Result Date: 3/17/2018  EXAMINATION: CT OF THE ABDOMEN AND PELVIS WITHOUT CONTRAST 3/17/2018 3:33 pm TECHNIQUE: CT of the abdomen and pelvis was performed without the administration of intravenous contrast. Multiplanar reformatted images are provided for review. Dose modulation, iterative reconstruction, and/or weight based adjustment of the mA/kV was utilized to reduce the radiation dose to as low as reasonably achievable. COMPARISON: None. HISTORY: ORDERING SYSTEM PROVIDED HISTORY: abdominal distension Status post arrest. FINDINGS: Lower Chest: The heart is enlarged. Trace bilateral pleural effusion is seen with bibasilar consolidation, left greater than right. Organs: Unremarkable unenhanced appearance of the liver, spleen, pancreas and left adrenal gland. On axial image 56, a 13 x 23 mm right adrenal gland nodule demonstrates central density 18 Hounsfield units in keeping with an adenoma. Bilateral renal atrophy is seen with bilateral renal cysts. Nonobstructing 2 mm calculus is seen at the midpole right kidney axial image 82.  GI/Bowel: Unremarkable appearance of the unopacified bowel. No inflammatory changes evident. No obstruction is seen. The appendix is visualized right lower quadrant. Multifocal diverticula involve the descending and sigmoid colon without evidence of acute inflammatory change. Pelvis: Prostate gland and seminal vesicles appear unremarkable. Urinary bladder is decompressed by Mitchell catheter. No adenopathy or free fluid. Peritoneum/Retroperitoneum: Calcific atherosclerotic disease aorta. No aneurysm. Unremarkable appearance of the IVC. No adenopathy or fluid. Bones/Soft Tissues: Unremarkable appearance. No acute osseous abnormality evident. 1. Trace bilateral pleural effusion with left basilar consolidation greater on the right. 2. Cardiomegaly. 3.  Calcific atherosclerotic disease aorta. 4. Benign right adrenal adenoma. 5. Cholecystectomy. 6. Atrophic kidneys with bilateral renal cysts. Nonobstructing calculus midpole right kidney measures 2 mm. Xr Abdomen (kub) (single Ap View)    Result Date: 3/17/2018  EXAMINATION: SUPINE VIEW(S) OF THE ABDOMEN 3/17/2018 7:27 pm COMPARISON: None. HISTORY: ORDERING SYSTEM PROVIDED HISTORY: quatro placement TECHNOLOGIST PROVIDED HISTORY: Reason for exam:->quatro placement FINDINGS: Unremarkable bowel gas pattern. No unusual abdominal or pelvic soft tissue or calcific density is seen. Visualized osseous structures appear unremarkable. Rectal thermometer is noted. Right femoral catheter has its tip projecting high over the expected location of the inferior vena cava. Unremarkable KUB. Right femoral catheter has its tip projecting high over the expected location of the inferior vena cava.      Ct Head Wo Contrast    Result Date: 3/17/2018  EXAMINATION: CT OF THE HEAD WITHOUT CONTRAST  3/17/2018 3:20 pm TECHNIQUE: CT of the head was performed without the administration of intravenous contrast. Dose modulation, iterative reconstruction, and/or weight based adjustment of the intrapleural air suggesting tiny pneumothorax. No pulmonary nodule for masses are identified Upper Abdomen: No acute abnormality identified. Soft Tissues/Bones: Nondisplaced fractures of the left 2nd through 6th ribs anterolaterally. Nondisplaced fractures of the right 3rd and 4th ribs anteriorly. Left basilar opacity, favoring atelectasis or scarring. Infectious process is possible in the proper clinical setting. Left 2nd through 6th and right 3rd/ 4th rib fractures, nondisplaced. Trace right intrapleural air, suggesting tiny pneumothorax. Advancement of the NG tube by 4-5 cm suggested. Xr Chest Portable    Result Date: 3/18/2018  EXAMINATION: SINGLE VIEW OF THE CHEST 3/18/2018 5:16 am COMPARISON: 15 hours prior HISTORY: ORDERING SYSTEM PROVIDED HISTORY: intubated TECHNOLOGIST PROVIDED HISTORY: Reason for exam:->intubated FINDINGS: An endotracheal tube is in place, the tip is 6 cm above the juanita. In enteric tube is in place as well, tip below the diaphragm. A right IJ approach CVC tip is within the right atrium. Stable mild cardiomegaly again noted. Median sternotomy wires. Mild pulmonary vascular congestion. No interstitial edema, focal consolidation, other airspace opacity. No discernible pleural effusion or pneumothorax. Endotracheal tube in place. Otherwise stable examination. Xr Chest Portable    Result Date: 3/17/2018  EXAMINATION: SINGLE VIEW OF THE CHEST 3/17/2018 1:36 pm COMPARISON: 2 hours prior HISTORY: ORDERING SYSTEM PROVIDED HISTORY: postintubation TECHNOLOGIST PROVIDED HISTORY: Reason for exam:->postintubation FINDINGS: There has been interval placement of an enteric tube with its distal extent not included on the provided image, however the side hole appears above the diaphragm. Advancement by 4-5 cm is suggested. Right-sided jugular venous catheter terminates overlying the expected location of SVC. No evidence for pneumothorax.   Cardiopulmonary findings are marrow signal intensity appears normal. The soft tissues demonstrate no acute abnormality. Widespread gyral restricted diffusion consistent with extensive infarction from anoxic brain injury. The findings were sent to the Radiology Results [address] at 12:18 am on 3/21/2018to be communicated to a licensed caregiver. EEG 3/21/2018  INTERPRETATION:  This EEG during which the patient is described as  comatose, is moderately to markedly abnormal due to diffuse background  disorganization and slowing with diffuse polymorphic delta and theta  frequency activity. This indicates the presence of moderate to marked  diffuse cerebral dysfunction of a nonspecific nature.       Assessment:  Active Problems:    Cardiac arrest (HCC)    End stage renal disease (HCC)    Pyocystis    Bandemia    Complicated UTI (urinary tract infection)    Acute encephalopathy    Lactic acidosis    Septic shock (HCC)    Severe hypoxic-ischemic encephalopathy    Sepsis ([facility]  Resolved Problems:    * No resolved hospital problems.  *    Plan:  · Code status [facility]  · Continue comfort care measures  · Hospice consulted      Aren Horton MD  Internal Medicine, PGY2

## 2018-03-21 NOTE — PROGRESS NOTES
Physical Therapy  DATE: 3/21/2018    NAME: Crissy Holly  MRN: [id number]   : 1950    Patient not seen this date for Physical Therapy due to:  [] Blood transfusion in progress  [] Hemodialysis  []  Patient Declined  [] Spine Precautions   [] Strict Bedrest  [] Surgery/ Procedure  [] Testing      [] Other        [] PT being discontinued at this time. Patient independent. No further needs. [x] PT being discontinued at this time as the patient is now [facility].      Radha Ellsworth, PT

## 2018-03-21 NOTE — FLOWSHEET NOTE
Nurse called for  after family meeting with neurologist.  Decision for code change/extubation was made by family with care from palliative care team.  Denise Qureshi was in room for SOS prior to extubation.  provided prayer blanket and presence for family which included pt's wife:Laura and her adult son, and pt's daughter Abelardo Zhong. Family stated that they just wanted it to be \"the 3 of them\" for the extubation and after. Daughter became agitated that  and palliative nurses were out in velazquez during extubation.  left grief packets with pt's chart and asked that nurse call  if she needed. Chaplains will remain available to offer spiritual and emotional support as needed.

## 2018-03-21 NOTE — PROCEDURES
[address] [facility] [address]                            ELECTROENCEPHALOGRAM REPORT    PATIENT NAME: Trini Ceballos                    :        1950  MED REC NO:   [id number]                             ROOM:       0121  ACCOUNT NO:   [de-identified]                           ADMIT DATE: 2018  PROVIDER:     Salty Galicia MD    DATE OF EE2018    HISTORY:  The patient is a 69-year-old male with cardiac arrest accompanied  by severe hypoglycemia just warmed from cooling protocol. The following is a portable 21-channel EEG without sedation using the 10/20  international placement system. There is no posterior dominant rhythm. The background is dominated by  low-to-medium amplitude, diffuse polymorphic 2.5 to 3.5 per second delta  and 4-6 per second theta frequency activity which was very disorganized and  a small amount of admixed low-voltage fast activity. Hyperventilation was  not done. There was no response to photic stimulation. There were no  gross asymmetries, focal disturbances, nor paroxysmal discharges. INTERPRETATION:  This EEG during which the patient is described as  comatose, is moderately to markedly abnormal due to diffuse background  disorganization and slowing with diffuse polymorphic delta and theta  frequency activity. This indicates the presence of moderate to marked  diffuse cerebral dysfunction of a nonspecific nature.         Marney Hodgkins, MD    D: 2018 7:48:05       T: 2018 7:49:16     [email or web address]  Job#: [id number]     Doc#: [id number]    CC:

## 2018-03-21 NOTE — PLAN OF CARE
Problem: SKIN INTEGRITY  Goal: Skin integrity is maintained or improved  Outcome: Ongoing      Problem: Cardiac Output - Decreased:  Goal: Hemodynamic stability will improve  Hemodynamic stability will improve   Outcome: Ongoing      Problem: Pain:  Goal: Pain level will decrease  Pain level will decrease   Outcome: Ongoing      Problem: Tissue Perfusion, Altered:  Goal: Circulatory function within specified parameters  Circulatory function within specified parameters   Outcome: Ongoing

## 2018-03-21 NOTE — PROGRESS NOTES
APTT 26.9 03/19/2018       Comprehensive Metabolic Profile:   Recent Labs      03/20/18   0135  03/20/18   0422  03/21/18   0440   NA  131*  133*  130*   K  4.3  4.8  4.0   CL  90*  92*  91*   CO2  24  23  22   BUN  22  26*  43*   CREATININE  4.84*  5.27*  7.16*   GLUCOSE  220*  207*  166*   CALCIUM  7.9*  8.6  8.4*      Magnesium:   Lab Results   Component Value Date    MG 1.5 03/20/2018    MG 1.9 03/18/2018    MG 2.0 03/18/2018     Phosphorus:   Lab Results   Component Value Date    PHOS 3.5 03/20/2018    PHOS 5.5 03/18/2018    PHOS 5.2 03/18/2018     Ionized Calcium:   Lab Results   Component Value Date    CAION 1.14 03/21/2018    CAION 1.04 03/20/2018    CAION 1.06 03/20/2018        Urinalysis:   Lab Results   Component Value Date    NITRU POSITIVE 03/17/2018    COLORU BROWN 03/17/2018    PHUR 6.5 03/17/2018    WBCUA TOO NUMEROUS TO COUNT 03/17/2018    RBCUA 20 TO 50 03/17/2018    MUCUS NOT REPORTED 03/17/2018    TRICHOMONAS NOT REPORTED 03/17/2018    YEAST MODERATE 03/17/2018    BACTERIA MANY 03/17/2018    SPECGRAV 1.025 03/17/2018    LEUKOCYTESUR MODERATE 03/17/2018    UROBILINOGEN Normal 03/17/2018    BILIRUBINUR NEGATIVE  Verified by ictotest. 03/17/2018    GLUCOSEU 1+ 03/17/2018    KETUA TRACE 03/17/2018    AMORPHOUS NOT REPORTED 03/17/2018       HgBA1c:  No results found for: LABA1C  TSH:  No results found for: TSH  Lactic Acid:   Lab Results   Component Value Date    LACTA 5.4 03/17/2018      Troponin:   No results for input(s): TROPONINI in the last 72 hours.   ASSESSMENT:     Patient Active Problem List    Diagnosis Date Noted    Anoxic encephalopathy ([facility] 03/20/2018    Sepsis ([facility]     Bandemia     Complicated UTI (urinary tract infection)     Acute encephalopathy     Lactic acidosis     Septic shock ([facility]     Cardiac arrest ([facility] 03/17/2018    End stage renal disease ([facility] 03/17/2018    Pyocystis 03/17/2018          PLAN:     WEAN PER PROTOCOL:  [x] No   [] Yes  [] N/A    ICU PROPHYLAXIS:  Stress ulcer:  [] PPI Agent  [x] [id number] [] Sucralfate  [] Other:  VTE:   [] Enoxaparin  [x] Unfract. Heparin Subcut  [] EPC Cuffs    NUTRITION:  [x] NPO [] Tube Feeding (Specify: ) [] TPN  [] PO    HOME MEDS RECONCILED: [x] No  [] Yes    CONSULTATION NEEDED:  [x] No  [] Yes    FAMILY UPDATED:    [] No  [x] Yes    TRANSFER OUT OF ICU:   [x] No  [] Yes        Additional Assessment:  Active Problems:    Cardiac arrest (HCC)    End stage renal disease ([facility]    Pyocystis    Bandemia    Complicated UTI (urinary tract infection)    Acute encephalopathy    Lactic acidosis    Septic shock ([facility])    Anoxic encephalopathy ([facility]    Sepsis ([facility]  Resolved Problems:    * No resolved hospital problems.  *     Cardiac arrest likely secondary to septic shock from UTI, though ACS not ruled out    Plan:  · Awaiting neuro recs  · Possible code status change today  · ID stated to stop vanco and cefepime and continue acyclovir and fluconazole  · Vent management per protocol  · Nephro recs  · Continue bladder irrigation          Urvashi Corley DO  Critical Care Resident  3/21/2018 6:48 AM

## 2018-03-21 NOTE — PROGRESS NOTES
[facility]  Occupational Therapy Not Seen Note    Patient not available for Occupational Therapy due to:    [] Testing:    [] Hemodialysis    [] Blood Transfusion in Progress    []Refusal by Patient:    [] Surgery/Procedure:    [] Strict Bedrest    [] Sedation    [] Spine Precautions     [x] Pt being transferred to palliative care at this time, very poor prognosis. OT services to be defered. [] Pt independent with functional mobility and functional tasks.  Pt with no OT acute care needs at this time, will defer OT eval.    [] Other    Next Scheduled Treatment: n/a    Signature: Electronically signed by [employer] on 3/21/2018 at 1:05 PM

## 2018-03-21 NOTE — PROGRESS NOTES
PALLIATIVE CARE TEAM    Patient: Angelo Grant  Room: 0121/0121-01    Reason For Consult   Goals of care evaluation  Distress management  Symptom Management  Guidance and support  Facilitate communications  Assistance in coordinating care  Recommendations for the above    Impression: Angelo Grant is a 70y.o. year old male with  has a past medical history of Abnormal stress test; Adiposity; Anemia in chronic kidney disease; Asthma; AV fistula stenosis (HCC); CAD (coronary artery disease); Cervical radiculopathy; Chest pain; Chronic kidney disease; COPD (chronic obstructive pulmonary disease) ([facility] Diabetes mellitus type 2 in obese ([facility] Dialysis patient [facility]); End stage renal disease ([facility] Gout; History of myocardial infarct at age less than 61 years; HTN (hypertension); Hyperlipidemia; Macular degeneration; Myocardial infarct; Obstructive sleep apnea; On home oxygen therapy; PAD (peripheral artery disease) ([facility] and Tremor. .  Currently hospitalized for the management of cardiac arrest.  The Palliative Care Team is following to assist with goals of care and family support. Code Status  DNR-CC-A    Vital Signs:  BP (!) 135/43   Pulse 94   Temp 99.3 °F (37.4 °C)   Resp 18   Ht 5' 11\" (1.803 m)   Wt 254 lb 3.1 oz (115.3 kg)   SpO2 98%   BMI 35.45 kg/m²     Patient Active Problem List   Diagnosis    Card[facility] arrest ([facility]    [facility] ([facility]    Pyocystis    Bandemia    Complicated UTI (urinary tract infection)    Acute encephalopathy    Lactic acidosis    Septic shock (HCC)    Severe hypoxic-ischemic encephalopathy    Sepsis ([facility]       Palliative Interaction:  Chart reviewed and update received from bedside RN. Writer to bedside to speak with family; family had decided after speaking with Neurology that they would like to change patient's code status to [facility] and proceed with terminal extubation per patient's wishes.   Writer updated CC resident and bedside RN regarding patient's wishes; code status changed to [facility]. Education provided to family regarding extubation including medications, withdrawal of ETT tube, and overall process. Family expressed understanding. Patient was premedicated and extubated per RRT. Family at bedside, grieving appropriately. Father Isidoro Patel provided \"last rites\" prior to extubation; Spiritual care present for family support and provided prayer blanket. Empathy and emotional support provided. Goals/Plan of care  Education/support to staff  Education/support to family  Communications with primary service  Non-pain symptom management - robinul and atropine drops for secretion management  Providing support for coping/adaptation/distress of family  Decision making regarding life prolonging treatment  Assistance with life support withdrawal  Code status clarified: [facility]  Palliative care orders introduced  Limit uncomfortable monitoring devices and alarms  Pain management for comfort  Medications to decrease non-pain symptoms  Continued communication updates  Recognizing, reflecting, and empathizing with family members' anticipatory grief  Withdrawal of life support intervention -extubation     Recommendations  1>Transfer out of ICU to [facility] bed for family comfort.   2>Hospice consult  3>Please contact palliative care team via [employer] with any concerns    Thank you,    Electronically signed by   Marilin Gibbs RN  Palliative Care Team  on 3/21/2018 at 2:06 PM

## 2018-03-21 NOTE — PROGRESS NOTES
Informed patient's family wish to discuss code status change. Family spoke to Neurology regarding poor neurological recovery. They wished to make the patient DNR-CC. Patient also has a living will. Spoke to family with palliative care and reiterated their wishes and wife and children were in agreement to make him comfortable and extubate him today. Will change orders for comfort care and will proceed with extubation.

## 2018-03-21 NOTE — FLOWSHEET NOTE
Visited and prayed with family at patient's bedside. Patient was intubated and sedated at the time. Per family, patient was not doing well.  maintained listening presence and offered support. Patient was raised Judaism and a member of [employer] [facility]. Family requested that patient be given last rites. Patient received sacrament of anointing of the sick, after praying with family. Chaplains would continue to visit for on going assessment of patient's condition and for more prayers and support to family. 03/21/18 0946   Encounter Summary   Services provided to: Patient and family together   Place of [address] [place], [place] [address]   Continue Visiting (03/21/2018)   Complexity of Encounter High   Length of Encounter 45 minutes   Spiritual Assessment Completed Yes   Routine   Type Follow up   Spiritual/Mandaen   Type Spiritual support   Assessment Approachable;Calm;Tearful   Intervention Active listening;Prayer; Anointing   Outcome Expressed gratitude   Sacraments   Sacrament of Sick-Anointing Anointed

## 2018-03-21 NOTE — PROGRESS NOTES
Patient arrived to room 435, family at bedside  Orientated family to new room and call light   Family at this time refusing on telemetry and continuous pulse 02 - [facility]   Patient repositioned for comfort will pillows and air mattress   Vianney Reyes RN

## 2018-03-21 NOTE — PROGRESS NOTES
(!) 185/65 - - 88 18 99 % - -   03/21/18 0700 (!) 156/63 - - 85 17 - - -   03/21/18 0600 (!) 145/54 - - 80 15 - - -   03/21/18 0500 (!) 144/55 - - 88 15 96 % - -   03/21/18 0400 (!) 158/49 99.3 °F (37.4 °C) Oral 92 16 99 % 5' 11\" (1.803 m) 254 lb 3.1 oz (115.3 kg)       Physical Exam   Constitutional: No distress. HENT:   Head: Normocephalic and atraumatic. Eyes: Right eye exhibits no discharge. Left eye exhibits no discharge. Neck: No tracheal deviation present. Cardiovascular: Normal heart sounds. Pulmonary/Chest:   Pt extubated, breathing with some labor   Abdominal: He exhibits no distension and no mass. Lymphadenopathy:     He has no cervical adenopathy. Neurological:   Obtunded   Skin: Skin is warm and dry. No rash noted. No erythema. Medical Decision Making:   I have independently reviewed/ordered the following labs:    CBC with Differential: Recent Labs      03/20/18   0422  03/20/18   1946  03/21/18   0440   WBC  20.9*   --   11.3   HGB  10.1*  8.0*  8.0*   HCT  31.4*  25.2*  25.7*   PLT  243   --   169   LYMPHOPCT  5*   --   6*   MONOPCT  9*   --   3     BMP: Recent Labs      03/18/18   1808   03/20/18   0422  03/20/18   1101  03/21/18   0440   NA  132*   < >  133*   --   130*   K  3.6*   < >  4.8   --   4.0   CL  94*   < >  92*   --   91*   CO2  20   < >  23   --   22   BUN  48*   < >  26*   --   43*   CREATININE  8.05*   < >  5.27*   --   7.16*   MG  1.9   --    --   1.5*   --     < > = values in this interval not displayed. Hepatic Function Panel: No results for input(s): PROT, LABALBU, BILIDIR, IBILI, BILITOT, ALKPHOS, ALT, AST in the last 72 hours. No results for input(s): RPR in the last 72 hours. No results for input(s): HIV in the last 72 hours. No results for input(s): BC in the last 72 hours.   Lab Results   Component Value Date    MUCUS NOT REPORTED 03/17/2018    RBC 2.55 03/21/2018    TRICHOMONAS NOT REPORTED 03/17/2018    WBC 11.3 03/21/2018    YEAST MODERATE

## 2018-03-22 NOTE — PROGRESS NOTES
[facility]  Internal Medicine Residency Program  Inpatient Daily Progress Note  ______________________________________________________________________________    Patient: Lexis Jasso  YOB: 1950   MRN: [id number]    Acct: [de-identified]     Admit date: 3/17/2018  Today's date: 03/22/18  Number of days in the hospital: 5  Expected Discharge Date: 03/26/18    Admitting Diagnosis: <principal problem not specified>    Subjective:   Pt seen and Chart reviewed. No acute events overnight. Patient appears comfortable. Family at bedside    ROS   Unobtainable due to patient condition.     Objective:   Vital Sign:  BP (!) 77/47   Pulse 103   Temp 101.9 °F (38.8 °C) (Axillary)   Resp 24   Ht 5' 11\" (1.803 m)   Wt 254 lb 3.1 oz (115.3 kg)   SpO2 97%   BMI 35.45 kg/m²       Physical Exam:  General appearance:   Appears comfortable  Lungs:  clear to auscultation, no wheezes, coarse breath sounds bilaterally  Heart[de-identified] S1 and S2 normal  Abdomen:  soft, nondistended, BS present  Extremities: no pedal edema noted   Skin: normal coloration and turgor, no rashes, no suspicious skin lesions noted    Medications:  Scheduled Medications   sodium chloride flush  10 mL Intravenous 2 times per day       PRN Medications  morphine 2 mg Q1H PRN   Or     morphine 4 mg Q1H PRN   LORazepam 1 mg Q1H PRN   Or     LORazepam 2 mg Q1H PRN   Glycopyrrolate 0.2 mg Q4H PRN   acetaminophen 650 mg Q4H PRN   midazolam 2 mg Q30 Min PRN       Diagnostic Labs and Imaging:  CBC:  Recent Labs      03/20/18   0422 03/20/18 1946 03/21/18   0440   WBC  20.9*   --   11.3   HGB  10.1*  8.0*  8.0*   PLT  243   --   169     BMP: Recent Labs      03/20/18   0135  03/20/18 0422 03/21/18   0440   NA  131*  133*  130*   K  4.3  4.8  4.0   CL  90*  92*  91*   CO2  24  23  22   BUN  22  26*  43*   CREATININE  4.84*  5.27*  7.16*   GLUCOSE  220*  207*  166*     Hepatic: No results for input(s): AST, ALT,

## 2018-03-22 NOTE — FLOWSHEET NOTE
[facility] [address]   Patient Death Note  DEATH   Shift date: 3/22/2018    Shift day: Thursday   Shift # 1                 Room # 2776/1797-33   Name: Nicolás Christianson            Age: 70 y.o. Gender: male          [place]: [address] of [place]:   Admit Date & Time: 3/17/2018 12:55 PM     Referral:    Actual date of death: 18   TOD: 0925am       SITUATION AT DEATH:  Patient was extubated yesterday. This morning with family - wife Nasima Louis, daughter Pily Soto and Diego Vigil present, patient . IS THIS A 'S CASE?    no    SPIRITUAL/EMOTIONAL INTERVENTION:  When  arrived family was ready to provide  home information to nurse. Patient's wife Ирина Bee and Diego Vigil were present. Blanquita was tearful as she talked about the events of the last few days.  was able to answer her questions about \"what happens next\" and this provided some comfort to her.  provided listening presence, words of comfort, grief packet, and will send condolence card. DOCTOR SIGNING DEATH NOTE:   Dr Eddy Lopez CONTACTING [address] [place] [address] OF BODY FORM RECEIVED? yes     HOME:  Name: [facility]: [place]  Phone Number: [phone]    NEXT OF KIN:  Name: Betty Womack  Relationship: spouse  Street Address: [address] [place] [address]  Phone Number: [phone], [phone]    ANY FOLLOW-UP NEEDED? No    Electronically signed by Chaplain Danisha, on 3/22/2018 at 12:04 PM.

## 2018-03-22 NOTE — PLAN OF CARE
remain free from falls   Outcome: Ongoing    Goal: Absence of physical injury  Absence of physical injury   Outcome: Ongoing

## 2018-03-23 LAB
CULTURE: NORMAL
Lab: NORMAL
Lab: NORMAL
SPECIMEN DESCRIPTION: NORMAL
SPECIMEN DESCRIPTION: NORMAL
STATUS: NORMAL
STATUS: NORMAL

## 2018-03-26 NOTE — DISCHARGE SUMMARY
[address] [place] [address]     Department of Internal Medicine - Staff Internal Medicine Service    INPATIENT DISCHARGE SUMMARY        Patient Identification:  Lexis Jasso is a 70 y.o. male. :  1950  MRN: [id number]     Acct: [de-identified]   Admit Date:  3/17/2018  Discharge date and time: 3/22/2018  1:22 PM   Attending Provider: No att. providers found                                     Admission Diagnoses:   Cardiac arrest [facility]) [I46.9]    Discharge Diagnoses: Active Problems:    Cardiac arrest ([facility]    End stage renal disease ([facility]    Pyocystis    Bandemia    Complicated UTI (urinary tract infection)    Acute encephalopathy    Lactic acidosis    Septic shock (HCC)    Severe hypoxic-ischemic encephalopathy    Sepsis ([facility]  Resolved Problems:    * No resolved hospital problems. *       Consults:   cardiology, ID and nephrology    Brief Inpatient course:    Patient with PMH of ESRD on dialysis, CAD s/p stenting 5 days ago and DM on amaryl at home presented 3/17/18 post asystole cardiac arrest after wife noticed patient's breath very slow and shallow and he was unarousable. EMS was called and CPR was initiated. ROSC was achieved after 11 minutes of ACLS. Patient was extremely hypoglycemic requiring D10 infusion. Quattro was placed and patient was started on hypothermia protocol. Levophed was required for pressor support. ID was consulted for concern for complicated UTI and possible pyocystis. He was started on vanco, cefepime fluconazole. Acyclovir was started and LP ordered to rule out  Meningitis and encephalitis. Cardiology was consulted in light of recent PCI and patient was started on heparin wit ASA and plavix. Neurology was consulted for anoxic encephalopathy and EEG and MRI brain were ordered. MRI brain was consistent was anoxic brain injury and EEG showed presence of moderate to marked diffuse cerebral dysfunction.  It was assessed patient has 1% chance for a functional neurologic recovery by neurology. Decision made by family to change code status to [facility] and patient terminally extubated this morning. Hospice consult placed.  Patient  on 3/22/18 at 9:25AM.    Procedures:  EEG    Any Hospital Acquired Infections: none    Discharge Functional Status:  stable    Disposition:     Note that over 30 minutes was spent in preparing discharge papers, discussing discharge with patient, medication review, etc.      Bonny Howard MD         Department of Internal Medicine  [facility]         3/25/2018, 9:13 PM

## 2018-03-28 ENCOUNTER — TELEPHONE (OUTPATIENT)
Dept: INTERNAL MEDICINE | Age: 68
End: 2018-03-28